# Patient Record
Sex: FEMALE | Race: WHITE | Employment: UNEMPLOYED | URBAN - METROPOLITAN AREA
[De-identification: names, ages, dates, MRNs, and addresses within clinical notes are randomized per-mention and may not be internally consistent; named-entity substitution may affect disease eponyms.]

---

## 2019-09-13 ENCOUNTER — HOSPITAL ENCOUNTER (OUTPATIENT)
Age: 19
Setting detail: OBSERVATION
Discharge: HOME | End: 2019-09-16
Attending: EMERGENCY MEDICINE | Admitting: OBSTETRICS & GYNECOLOGY
Payer: COMMERCIAL

## 2019-09-13 ENCOUNTER — HOSPITAL ENCOUNTER (EMERGENCY)
Age: 19
Discharge: ANOTHER HOSPITAL | End: 2019-09-13
Payer: COMMERCIAL

## 2019-09-13 ENCOUNTER — APPOINTMENT (OUTPATIENT)
Dept: CT IMAGING | Age: 19
End: 2019-09-13
Payer: COMMERCIAL

## 2019-09-13 ENCOUNTER — APPOINTMENT (OUTPATIENT)
Dept: ULTRASOUND IMAGING | Age: 19
End: 2019-09-13
Payer: COMMERCIAL

## 2019-09-13 VITALS
HEART RATE: 73 BPM | SYSTOLIC BLOOD PRESSURE: 114 MMHG | OXYGEN SATURATION: 97 % | RESPIRATION RATE: 16 BRPM | WEIGHT: 150 LBS | TEMPERATURE: 97.7 F | HEIGHT: 67 IN | DIASTOLIC BLOOD PRESSURE: 77 MMHG | BODY MASS INDEX: 23.54 KG/M2

## 2019-09-13 DIAGNOSIS — N83.8 OVARIAN MASS: Primary | ICD-10-CM

## 2019-09-13 DIAGNOSIS — N83.209 CYST OF OVARY, UNSPECIFIED LATERALITY: ICD-10-CM

## 2019-09-13 DIAGNOSIS — R19.00 ABDOMINAL MASS, UNSPECIFIED ABDOMINAL LOCATION: ICD-10-CM

## 2019-09-13 DIAGNOSIS — Z98.890 S/P LAPAROTOMY: ICD-10-CM

## 2019-09-13 DIAGNOSIS — N83.8 OVARIAN MASS, RIGHT: Primary | ICD-10-CM

## 2019-09-13 DIAGNOSIS — R93.5 ABNORMAL CT OF THE ABDOMEN: ICD-10-CM

## 2019-09-13 PROBLEM — K58.9 IBS (IRRITABLE BOWEL SYNDROME): Status: ACTIVE | Noted: 2019-09-13

## 2019-09-13 PROBLEM — F41.9 ANXIETY: Status: ACTIVE | Noted: 2019-09-13

## 2019-09-13 PROBLEM — J45.909 ASTHMA: Status: ACTIVE | Noted: 2019-09-13

## 2019-09-13 LAB
-: NORMAL
ABO/RH: NORMAL
ABSOLUTE EOS #: 0.27 K/UL (ref 0–0.44)
ABSOLUTE IMMATURE GRANULOCYTE: <0.03 K/UL (ref 0–0.3)
ABSOLUTE LYMPH #: 1.49 K/UL (ref 1.2–5.2)
ABSOLUTE MONO #: 0.57 K/UL (ref 0.1–1.4)
ALBUMIN SERPL-MCNC: 4.1 G/DL (ref 3.5–5.2)
ALBUMIN SERPL-MCNC: 4.4 G/DL (ref 3.5–5.2)
ALBUMIN/GLOBULIN RATIO: 1.6 (ref 1–2.5)
ALBUMIN/GLOBULIN RATIO: 1.8 (ref 1–2.5)
ALP BLD-CCNC: 49 U/L (ref 35–104)
ALP BLD-CCNC: 49 U/L (ref 35–104)
ALT SERPL-CCNC: 10 U/L (ref 5–33)
ALT SERPL-CCNC: 9 U/L (ref 5–33)
AMORPHOUS: NORMAL
ANION GAP SERPL CALCULATED.3IONS-SCNC: 11 MMOL/L (ref 9–17)
ANION GAP SERPL CALCULATED.3IONS-SCNC: 14 MMOL/L (ref 9–17)
ANTIBODY SCREEN: NEGATIVE
ARM BAND NUMBER: NORMAL
AST SERPL-CCNC: 13 U/L
AST SERPL-CCNC: 13 U/L
BACTERIA: NORMAL
BASOPHILS # BLD: 1 % (ref 0–2)
BASOPHILS ABSOLUTE: 0.08 K/UL (ref 0–0.2)
BILIRUB SERPL-MCNC: 0.9 MG/DL (ref 0.3–1.2)
BILIRUB SERPL-MCNC: 1.01 MG/DL (ref 0.3–1.2)
BILIRUBIN URINE: NEGATIVE
BUN BLDV-MCNC: 8 MG/DL (ref 6–20)
BUN BLDV-MCNC: 9 MG/DL (ref 6–20)
BUN/CREAT BLD: 14 (ref 9–20)
BUN/CREAT BLD: ABNORMAL (ref 9–20)
CA 125: 20 U/ML
CALCIUM SERPL-MCNC: 8.7 MG/DL (ref 8.6–10.4)
CALCIUM SERPL-MCNC: 9.1 MG/DL (ref 8.6–10.4)
CASTS UA: NORMAL /LPF
CHLORIDE BLD-SCNC: 104 MMOL/L (ref 98–107)
CHLORIDE BLD-SCNC: 106 MMOL/L (ref 98–107)
CO2: 20 MMOL/L (ref 20–31)
CO2: 24 MMOL/L (ref 20–31)
COLOR: YELLOW
COMMENT UA: NORMAL
CREAT SERPL-MCNC: 0.46 MG/DL (ref 0.5–0.9)
CREAT SERPL-MCNC: 0.65 MG/DL (ref 0.5–0.9)
CRYSTALS, UA: NORMAL /HPF
DIFFERENTIAL TYPE: ABNORMAL
EOSINOPHILS RELATIVE PERCENT: 4 % (ref 1–4)
EPITHELIAL CELLS UA: NORMAL /HPF (ref 0–25)
EXPIRATION DATE: NORMAL
GFR AFRICAN AMERICAN: >60 ML/MIN
GFR AFRICAN AMERICAN: >60 ML/MIN
GFR NON-AFRICAN AMERICAN: >60 ML/MIN
GFR NON-AFRICAN AMERICAN: >60 ML/MIN
GFR SERPL CREATININE-BSD FRML MDRD: ABNORMAL ML/MIN/{1.73_M2}
GFR SERPL CREATININE-BSD FRML MDRD: ABNORMAL ML/MIN/{1.73_M2}
GFR SERPL CREATININE-BSD FRML MDRD: NORMAL ML/MIN/{1.73_M2}
GFR SERPL CREATININE-BSD FRML MDRD: NORMAL ML/MIN/{1.73_M2}
GLUCOSE BLD-MCNC: 79 MG/DL (ref 70–99)
GLUCOSE BLD-MCNC: 86 MG/DL (ref 70–99)
GLUCOSE URINE: NEGATIVE
HCG QUANTITATIVE: <1 IU/L
HCG(URINE) PREGNANCY TEST: NEGATIVE
HCT VFR BLD CALC: 36.5 % (ref 36.3–47.1)
HCT VFR BLD CALC: 36.8 % (ref 36.3–47.1)
HEMOGLOBIN: 11.1 G/DL (ref 11.9–15.1)
HEMOGLOBIN: 11.5 G/DL (ref 11.9–15.1)
IMMATURE GRANULOCYTES: 0 %
KETONES, URINE: NEGATIVE
LACTIC ACID, WHOLE BLOOD: NORMAL MMOL/L (ref 0.7–2.1)
LACTIC ACID: 0.6 MMOL/L (ref 0.5–2.2)
LEUKOCYTE ESTERASE, URINE: NEGATIVE
LIPASE: 18 U/L (ref 13–60)
LYMPHOCYTES # BLD: 20 % (ref 25–45)
MCH RBC QN AUTO: 27.5 PG (ref 25.2–33.5)
MCH RBC QN AUTO: 28.4 PG (ref 25.2–33.5)
MCHC RBC AUTO-ENTMCNC: 30.4 G/DL (ref 28.4–34.8)
MCHC RBC AUTO-ENTMCNC: 31.3 G/DL (ref 28.4–34.8)
MCV RBC AUTO: 90.3 FL (ref 82.6–102.9)
MCV RBC AUTO: 90.9 FL (ref 82.6–102.9)
MONOCYTES # BLD: 8 % (ref 2–8)
MUCUS: NORMAL
NITRITE, URINE: NEGATIVE
NRBC AUTOMATED: 0 PER 100 WBC
NRBC AUTOMATED: 0 PER 100 WBC
OTHER OBSERVATIONS UA: NORMAL
PDW BLD-RTO: 12.7 % (ref 11.8–14.4)
PDW BLD-RTO: 12.8 % (ref 11.8–14.4)
PH UA: 7.5 (ref 5–9)
PLATELET # BLD: 202 K/UL (ref 138–453)
PLATELET # BLD: 226 K/UL (ref 138–453)
PLATELET ESTIMATE: ABNORMAL
PMV BLD AUTO: 10.3 FL (ref 8.1–13.5)
PMV BLD AUTO: 9.8 FL (ref 8.1–13.5)
POTASSIUM SERPL-SCNC: 3.7 MMOL/L (ref 3.7–5.3)
POTASSIUM SERPL-SCNC: 4.2 MMOL/L (ref 3.7–5.3)
PROTEIN UA: NEGATIVE
RBC # BLD: 4.04 M/UL (ref 3.95–5.11)
RBC # BLD: 4.05 M/UL (ref 3.95–5.11)
RBC # BLD: ABNORMAL 10*6/UL
RBC UA: NORMAL /HPF (ref 0–2)
RENAL EPITHELIAL, UA: NORMAL /HPF
SEG NEUTROPHILS: 67 % (ref 34–64)
SEGMENTED NEUTROPHILS ABSOLUTE COUNT: 5.17 K/UL (ref 1.8–8)
SODIUM BLD-SCNC: 139 MMOL/L (ref 135–144)
SODIUM BLD-SCNC: 140 MMOL/L (ref 135–144)
SPECIFIC GRAVITY UA: 1.01 (ref 1.01–1.02)
TOTAL PROTEIN: 6.7 G/DL (ref 6.4–8.3)
TOTAL PROTEIN: 6.9 G/DL (ref 6.4–8.3)
TRICHOMONAS: NORMAL
TURBIDITY: CLEAR
URINE HGB: NEGATIVE
UROBILINOGEN, URINE: NORMAL
WBC # BLD: 4.1 K/UL (ref 4.5–13.5)
WBC # BLD: 7.6 K/UL (ref 4.5–13.5)
WBC # BLD: ABNORMAL 10*3/UL
WBC UA: NORMAL /HPF (ref 0–5)
YEAST: NORMAL

## 2019-09-13 PROCEDURE — 81001 URINALYSIS AUTO W/SCOPE: CPT

## 2019-09-13 PROCEDURE — 93976 VASCULAR STUDY: CPT

## 2019-09-13 PROCEDURE — 83605 ASSAY OF LACTIC ACID: CPT

## 2019-09-13 PROCEDURE — 84702 CHORIONIC GONADOTROPIN TEST: CPT

## 2019-09-13 PROCEDURE — 85025 COMPLETE CBC W/AUTO DIFF WBC: CPT

## 2019-09-13 PROCEDURE — 6360000004 HC RX CONTRAST MEDICATION: Performed by: PHYSICIAN ASSISTANT

## 2019-09-13 PROCEDURE — G0378 HOSPITAL OBSERVATION PER HR: HCPCS

## 2019-09-13 PROCEDURE — 86850 RBC ANTIBODY SCREEN: CPT

## 2019-09-13 PROCEDURE — 86901 BLOOD TYPING SEROLOGIC RH(D): CPT

## 2019-09-13 PROCEDURE — 85027 COMPLETE CBC AUTOMATED: CPT

## 2019-09-13 PROCEDURE — 99285 EMERGENCY DEPT VISIT HI MDM: CPT

## 2019-09-13 PROCEDURE — 76830 TRANSVAGINAL US NON-OB: CPT

## 2019-09-13 PROCEDURE — 80053 COMPREHEN METABOLIC PANEL: CPT

## 2019-09-13 PROCEDURE — 76856 US EXAM PELVIC COMPLETE: CPT

## 2019-09-13 PROCEDURE — 6370000000 HC RX 637 (ALT 250 FOR IP): Performed by: PHYSICIAN ASSISTANT

## 2019-09-13 PROCEDURE — 86900 BLOOD TYPING SEROLOGIC ABO: CPT

## 2019-09-13 PROCEDURE — 86304 IMMUNOASSAY TUMOR CA 125: CPT

## 2019-09-13 PROCEDURE — 83690 ASSAY OF LIPASE: CPT

## 2019-09-13 PROCEDURE — 36415 COLL VENOUS BLD VENIPUNCTURE: CPT

## 2019-09-13 PROCEDURE — 74177 CT ABD & PELVIS W/CONTRAST: CPT

## 2019-09-13 PROCEDURE — 81025 URINE PREGNANCY TEST: CPT

## 2019-09-13 RX ORDER — DOCUSATE SODIUM 100 MG/1
100 CAPSULE, LIQUID FILLED ORAL 2 TIMES DAILY
Status: ON HOLD | COMMUNITY
End: 2019-09-16 | Stop reason: HOSPADM

## 2019-09-13 RX ORDER — CARBAMAZEPINE 200 MG/1
1000 TABLET ORAL EVERY EVENING
COMMUNITY
End: 2021-03-10 | Stop reason: ALTCHOICE

## 2019-09-13 RX ORDER — ACETAMINOPHEN 325 MG/1
650 TABLET ORAL ONCE
Status: COMPLETED | OUTPATIENT
Start: 2019-09-13 | End: 2019-09-13

## 2019-09-13 RX ORDER — LEVOTHYROXINE SODIUM 0.05 MG/1
50 TABLET ORAL DAILY
COMMUNITY
End: 2019-10-03 | Stop reason: ALTCHOICE

## 2019-09-13 RX ORDER — CARBAMAZEPINE 200 MG/1
800 TABLET ORAL EVERY MORNING
COMMUNITY
End: 2021-03-10 | Stop reason: ALTCHOICE

## 2019-09-13 RX ORDER — M-VIT,TX,IRON,MINS/CALC/FOLIC 27MG-0.4MG
1 TABLET ORAL DAILY
COMMUNITY
End: 2021-03-10 | Stop reason: ALTCHOICE

## 2019-09-13 RX ORDER — RIVASTIGMINE TARTRATE 6 MG/1
6 CAPSULE ORAL 2 TIMES DAILY
COMMUNITY
End: 2019-10-03

## 2019-09-13 RX ORDER — OYSTER SHELL CALCIUM WITH VITAMIN D 500; 200 MG/1; [IU]/1
1 TABLET, FILM COATED ORAL 2 TIMES DAILY
COMMUNITY
End: 2021-03-10 | Stop reason: ALTCHOICE

## 2019-09-13 RX ORDER — ACETAMINOPHEN 325 MG/1
650 TABLET ORAL EVERY 4 HOURS PRN
COMMUNITY

## 2019-09-13 RX ORDER — FENOFIBRATE 145 MG/1
145 TABLET, COATED ORAL DAILY
COMMUNITY
End: 2021-03-10 | Stop reason: ALTCHOICE

## 2019-09-13 RX ORDER — BUSPIRONE HYDROCHLORIDE 10 MG/1
20 TABLET ORAL DAILY
COMMUNITY
End: 2021-03-10 | Stop reason: ALTCHOICE

## 2019-09-13 RX ADMIN — ACETAMINOPHEN 650 MG: 325 TABLET, FILM COATED ORAL at 13:32

## 2019-09-13 RX ADMIN — IOPAMIDOL 75 ML: 755 INJECTION, SOLUTION INTRAVENOUS at 12:58

## 2019-09-13 ASSESSMENT — ENCOUNTER SYMPTOMS
SORE THROAT: 0
DIARRHEA: 0
VOMITING: 0
WHEEZING: 0
EYE DISCHARGE: 0
COUGH: 0
CHEST TIGHTNESS: 0
ABDOMINAL PAIN: 1
SHORTNESS OF BREATH: 0
COUGH: 0
EYE REDNESS: 0
RHINORRHEA: 0
CONSTIPATION: 0
VOMITING: 0
SHORTNESS OF BREATH: 0
NAUSEA: 1
DIARRHEA: 0
BACK PAIN: 0
NAUSEA: 1
SORE THROAT: 0
BLOOD IN STOOL: 0
ABDOMINAL PAIN: 1
CONSTIPATION: 0

## 2019-09-13 ASSESSMENT — PAIN DESCRIPTION - FREQUENCY
FREQUENCY: CONTINUOUS
FREQUENCY: CONTINUOUS

## 2019-09-13 ASSESSMENT — PAIN DESCRIPTION - LOCATION
LOCATION: ABDOMEN
LOCATION: ABDOMEN
LOCATION: HEAD;PELVIS

## 2019-09-13 ASSESSMENT — PAIN DESCRIPTION - ORIENTATION
ORIENTATION: RIGHT;LOWER
ORIENTATION: RIGHT;LOWER

## 2019-09-13 ASSESSMENT — PAIN DESCRIPTION - DESCRIPTORS
DESCRIPTORS: CRAMPING;SHARP
DESCRIPTORS: CRAMPING;SHARP

## 2019-09-13 ASSESSMENT — PAIN DESCRIPTION - PAIN TYPE
TYPE: ACUTE PAIN

## 2019-09-13 ASSESSMENT — PAIN SCALES - GENERAL
PAINLEVEL_OUTOF10: 7
PAINLEVEL_OUTOF10: 7
PAINLEVEL_OUTOF10: 8
PAINLEVEL_OUTOF10: 6

## 2019-09-13 ASSESSMENT — PAIN DESCRIPTION - ONSET: ONSET: ON-GOING

## 2019-09-13 NOTE — ED NOTES
Writer and THAIS Valera PA-C at the bedside to discuss results and which hospital they would prefer to be transferred to.     Fariba Becerra RN  09/13/19 9538

## 2019-09-13 NOTE — CONSULTS
[address] [facility]    Patient Name: Hayden Jacobo     Patient : 2000  Room/Bed: 15[facility]  Admission Date/Time: 2019  7:12 PM  Primary Care Physician: Joseph Henderson DO    Consulting Provider: Dr. Naomi Tariq MD  Reason for Consult: Ovarian Mass    CC:   Chief Complaint   Patient presents with    Abdominal Pain     RLQ since this morning    Nausea                HPI: Hayden Jacobo is a 19 y.o. female  who presents to the ED as a tranfers from [place] , c/o RLQ pain since this morning. She reports waking up with nausea and crampy pain in her RLQ. She states the pain has been constant all day on the right and radiates to her right low back. She does report bloating for the past 6 months. She reports abdominal cramping and constipation/diarrhea that she has seen her PCP for and was told her symptoms were attributable to her IBS. She has tried Tylenol without much relief. The is worse with valsalva and laying flat. She denies vaginal bleeding, discharge, dysuria, hematuria, V/D, constipation, loss of appetite, F/C, SOB, chest pain. She reports having regular monthly periods with mild cramping. Her last bowel movement was today. Patient's last menstrual period was 2019.      REVIEW OF SYSTEMS:   Constitutional: negative fever, negative chills  HEENT: negative visual disturbances, negative headaches  Respiratory: negative dyspnea, negative cough  Cardiovascular: negative chest pain,  negative palpitations  Gastrointestinal: positive RLQ abdominal pain, negative RUQ pain, positive nausea, negative vomiting, negative diarrhea, negative constipation, positive bloating  Genitourinary: negative dysuria, negative vaginal discharge, negative vaginal bleeding  Dermatological: negative rash, negative wounds  Hematologic: negative bleeding/clotting disorder  Immunologic: negative recent illness, negative recent sick contact, negative allergic reactions  Lymphatic: and pelvis today. HISTORY: ORDERING SYSTEM PROVIDED HISTORY: Abdominal mass, unspecified abdominal location FINDINGS: Measurements: Uterus:  10.6 x 2.8 x 2.4 cm Endometrial stripe:  6 mm Right Ovary:  Not measured Left Ovary:  4.9 x 2.8 x 2.4 cm Ultrasound Findings: Uterus: Uterus demonstrates normal myometrial echotexture. Endometrial stripe: Endometrial stripe is within normal limits. Right Ovary: The right ovary is not identified. Left Ovary:  Left ovary is within normal limits. There is normal arterial and venous doppler flow. Free Fluid: No evidence of free fluid. Large cystic mass occupies the majority of the inferior abdomen and pelvis measuring at least 20 x 8 x 17 cm, corresponding to that demonstrated with CT. There is a thin septation noted. No internal solid components or Doppler signal.     Large cystic mass with thin septation in the abdomen and pelvis, as demonstrated on CT. This presumably arises from the right adnexa or ovary, however ovarian parenchyma is not demonstrated associated with this mass. Gynecology consultation is recommended. Normal appearance of the uterus and left ovary. Ct Abdomen Pelvis W Iv Contrast Additional Contrast? None    Result Date: 9/13/2019  EXAMINATION: CT OF THE ABDOMEN AND PELVIS WITH CONTRAST, 9/13/2019 12:55 pm TECHNIQUE: CT of the abdomen and pelvis was performed with the administration of intravenous contrast. Multiplanar reformatted images are provided for review. Dose modulation, iterative reconstruction, and/or weight based adjustment of the mA/kV was utilized to reduce the radiation dose to as low as reasonably achievable. COMPARISON: None HISTORY: ORDERING SYSTEM PROVIDED HISTORY: [facility] abdominal pain TECHNOLOGIST PROVIDED HISTORY: FINDINGS: Lower Chest: No acute findings. Organs: No focal lesions of the liver, spleen, adrenal glands, pancreas, or gallbladder. Small splenule.   3 mm left renal calculus and probable subcentimeter cyst.  Mild fullness of the renal collecting systems and pelves bilaterally. GI/Bowel: No CT evidence of appendicitis. Mild fecal stasis in the visualized colon. No signs of bowel obstruction. Pelvis: There is a very large cystic mass measuring approximately 21 cm transverse by 9 cm AP by 15 cm cephalocaudal with compression of regional structures and an apparent thin septation. No enhancing nodules are seen. This is most likely arising from the right ovary representing a large ovarian cyst/serous cystadenoma. Differential includes a mucinous cystadenoma, mesenteric cyst, and probably less likely cystic ovarian malignancy. Gynecologic consultation is recommended. The uterus and left ovary appear grossly unremarkable by CT. Peritoneum/Retroperitoneum: No evidence of abdominal aortic aneurysm. Narrowing of the celiac trunk with apparent poststenotic dilatation related to the diaphragmatic huy/median arcuate ligament syndrome. No significant bulky adenopathy. Bones/Soft Tissues: No acute osseous abnormality is seen. Large cystic mass in the pelvis extending up to the abdomen measuring up to 21 cm most likely ovarian in origin. 3 mm left renal calculus. Mild fullness of the renal collecting systems likely related to compression of the ureters from the above described mass. Findings were discussed with Thelma Irby at 1:31 pm on 2019. ASSESSMENT & PLAN:    Leoncio Encarnacion is a 19 y.o. female  transferred to [facility] ED from [place]    [id number] ovarian mass    - VSS   - Abdomen non tender, no guarding or rigidity. No concern for torsion or other acute process requiring immediate surgery. -  CT abdomen/pelvis: Large cystic mass in the pelvis extending up to the abdomen measuring up to 21 cm most likely ovarian in origin. 3 mm left renal calculus.  Mild fullness of the renal collecting systems likely related to compression of the ureters from the above described mass.    -  U/S: Large cystic mass with thin

## 2019-09-13 NOTE — ED PROVIDER NOTES
Skin: Negative for pallor and rash. Allergic/Immunologic: Negative for food allergies and immunocompromised state. Neurological: Negative for dizziness, syncope, weakness and light-headedness. Hematological: Negative for adenopathy. Does not bruise/bleed easily. Psychiatric/Behavioral: Negative for behavioral problems and suicidal ideas. The patient is not nervous/anxious. Except as noted above the remainder of the review of systems was reviewed and negative. PAST MEDICAL HISTORY   History reviewed. No pertinent past medical history. SURGICALHISTORY       Past Surgical History:   Procedure Laterality Date    TONSILLECTOMY           CURRENT MEDICATIONS       Previous Medications    ACETAMINOPHEN (TYLENOL) 325 MG TABLET    Take 650 mg by mouth every 4 hours as needed for Pain    BUSPIRONE (BUSPAR) 10 MG TABLET    Take 20 mg by mouth 3 times daily    CALCIUM-VITAMIN D (OSCAL-500) 500-200 MG-UNIT PER TABLET    Take 1 tablet by mouth 2 times daily    CARBAMAZEPINE (TEGRETOL) 200 MG TABLET    Take 800 mg by mouth every morning    CARBAMAZEPINE (TEGRETOL) 200 MG TABLET    Take 1,000 mg by mouth every evening    DOCUSATE SODIUM (COLACE) 100 MG CAPSULE    Take 100 mg by mouth 2 times daily    FENOFIBRATE (TRICOR) 145 MG TABLET    Take 145 mg by mouth daily    LEVOTHYROXINE (SYNTHROID) 50 MCG TABLET    Take 50 mcg by mouth Daily    MAGNESIUM HYDROXIDE (MILK OF MAGNESIA) 400 MG/5ML SUSPENSION    Take 30 mLs by mouth daily as needed for Constipation    MULTIPLE VITAMINS-MINERALS (THERAPEUTIC MULTIVITAMIN-MINERALS) TABLET    Take 1 tablet by mouth daily    RIVASTIGMINE (EXELON) 6 MG CAPSULE    Take 6 mg by mouth 2 times daily    VITAMIN D 1000 UNITS CAPS    Take 1 capsule by mouth daily       ALLERGIES     Patient has no known allergies. FAMILY HISTORY     History reviewed. No pertinent family history.        SOCIAL HISTORY       Social History     Socioeconomic History    Marital status: Single

## 2019-09-13 NOTE — ED NOTES
Contacted Dr. Lizzie Montenegro per [facility] request.     Eryn PAYAN Reser  09/13/19 0628

## 2019-09-13 NOTE — ED PROVIDER NOTES
Musculoskeletal: Normal range of motion. She exhibits no deformity. Neurological: She is alert and oriented to person, place, and time. Skin: Skin is warm and dry. Capillary refill takes less than 2 seconds. No rash noted. She is not diaphoretic. Psychiatric: Thought content normal.   Nursing note and vitals reviewed. DIFFERENTIAL  DIAGNOSIS     PLAN (LABS / IMAGING / EKG):  Orders Placed This Encounter   Procedures        CBC WITH AUTO DIFFERENTIAL    COMPREHENSIVE METABOLIC PANEL    HCG, Quantitative, Pregnancy    Inpatient consult to Obstetrics / Gynecology    TYPE AND SCREEN    PATIENT STATUS (FROM ED OR OR/PROCEDURAL) Observation       MEDICATIONS ORDERED:  No orders of the defined types were placed in this encounter.       DDX: Ovarian mass, pelvic mass, ovarian torsion, appendicitis, ectopic pregnancy    DIAGNOSTIC RESULTS / EMERGENCY DEPARTMENT COURSE / MDM     LABS:  Results for orders placed or performed during the hospital encounter of 09/13/19      Result Value Ref Range     20 <38 U/mL   CBC WITH AUTO DIFFERENTIAL   Result Value Ref Range    WBC 7.6 4.5 - 13.5 k/uL    RBC 4.04 3.95 - 5.11 m/uL    Hemoglobin 11.1 (L) 11.9 - 15.1 g/dL    Hematocrit 36.5 36.3 - 47.1 %    MCV 90.3 82.6 - 102.9 fL    MCH 27.5 25.2 - 33.5 pg    MCHC 30.4 28.4 - 34.8 g/dL    RDW 12.8 11.8 - 14.4 %    Platelets 941 422 - 995 k/uL    MPV 10.3 8.1 - 13.5 fL    NRBC Automated 0.0 0.0 per 100 WBC    Differential Type NOT REPORTED     Seg Neutrophils 67 (H) 34 - 64 %    Lymphocytes 20 (L) 25 - 45 %    Monocytes 8 2 - 8 %    Eosinophils % 4 1 - 4 %    Basophils 1 0 - 2 %    Immature Granulocytes 0 0 %    Segs Absolute 5.17 1.80 - 8.00 k/uL    Absolute Lymph # 1.49 1.20 - 5.20 k/uL    Absolute Mono # 0.57 0.10 - 1.40 k/uL    Absolute Eos # 0.27 0.00 - 0.44 k/uL    Basophils Absolute 0.08 0.00 - 0.20 k/uL    Absolute Immature Granulocyte <0.03 0.00 - 0.30 k/uL    WBC Morphology NOT REPORTED     RBC Morphology NOT REPORTED     Platelet Estimate NOT REPORTED    COMPREHENSIVE METABOLIC PANEL   Result Value Ref Range    Glucose 79 70 - 99 mg/dL    BUN 8 6 - 20 mg/dL    CREATININE 0.46 (L) 0.50 - 0.90 mg/dL    Bun/Cre Ratio NOT REPORTED 9 - 20    Calcium 8.7 8.6 - 10.4 mg/dL    Sodium 140 135 - 144 mmol/L    Potassium 3.7 3.7 - 5.3 mmol/L    Chloride 106 98 - 107 mmol/L    CO2 20 20 - 31 mmol/L    Anion Gap 14 9 - 17 mmol/L    Alkaline Phosphatase 49 35 - 104 U/L    ALT 9 5 - 33 U/L    AST 13 <32 U/L    Total Bilirubin 1.01 0.3 - 1.2 mg/dL    Total Protein 6.7 6.4 - 8.3 g/dL    Alb 4.1 3.5 - 5.2 g/dL    Albumin/Globulin Ratio 1.6 1.0 - 2.5    GFR Non-African American >60 >60 mL/min    GFR African American >60 >60 mL/min    GFR Comment          GFR Staging NOT REPORTED    TYPE AND SCREEN   Result Value Ref Range    Expiration Date 09/16/2019     Arm Band Number BE [id number]     ABO/Rh A POSITIVE     Antibody Screen NEGATIVE        RADIOLOGY:  None    EKG  None    All EKG's are interpreted by the Emergency Department Physician who either signs or Co-signs this chart in the absence of a cardiologist.    MDM/EMERGENCY DEPARTMENT COURSE:  Patient is a 18-year-old female transferred from outside hospital for pelvic ovarian mass on the right side. On exam no acute distress, vital signs are stable afebrile nontoxic. Ultrasound shows large cystic mass in abdomen and pelvis presumably arising from the adnexa up to 21 cm. Abdomen if soft, tender in RLQ, no peritoneal findings. 7:53 PM  [facility] states she will come see patient. 10:50 PM  Spoke with OB resident who will admit patient primarily to observation unit. They plan to do surgery tomorrow morning. Patient was consented by them for ex lap. PROCEDURES:  None    CONSULTS:  IP CONSULT TO OB GYN    CRITICAL CARE:  None    FINAL IMPRESSION      1.  Ovarian mass, right          DISPOSITION / [address] Admitted 09/13/2019

## 2019-09-14 ENCOUNTER — ANESTHESIA EVENT (OUTPATIENT)
Dept: OPERATING ROOM | Age: 19
End: 2019-09-14
Payer: COMMERCIAL

## 2019-09-14 PROCEDURE — 6370000000 HC RX 637 (ALT 250 FOR IP): Performed by: STUDENT IN AN ORGANIZED HEALTH CARE EDUCATION/TRAINING PROGRAM

## 2019-09-14 PROCEDURE — G0378 HOSPITAL OBSERVATION PER HR: HCPCS

## 2019-09-14 PROCEDURE — 96375 TX/PRO/DX INJ NEW DRUG ADDON: CPT

## 2019-09-14 PROCEDURE — 6360000002 HC RX W HCPCS: Performed by: STUDENT IN AN ORGANIZED HEALTH CARE EDUCATION/TRAINING PROGRAM

## 2019-09-14 PROCEDURE — 99024 POSTOP FOLLOW-UP VISIT: CPT | Performed by: OBSTETRICS & GYNECOLOGY

## 2019-09-14 PROCEDURE — 96374 THER/PROPH/DIAG INJ IV PUSH: CPT

## 2019-09-14 PROCEDURE — 2580000003 HC RX 258: Performed by: STUDENT IN AN ORGANIZED HEALTH CARE EDUCATION/TRAINING PROGRAM

## 2019-09-14 RX ORDER — ONDANSETRON 2 MG/ML
4 INJECTION INTRAMUSCULAR; INTRAVENOUS EVERY 6 HOURS PRN
Status: DISCONTINUED | OUTPATIENT
Start: 2019-09-14 | End: 2019-09-16 | Stop reason: HOSPADM

## 2019-09-14 RX ORDER — KETOROLAC TROMETHAMINE 30 MG/ML
30 INJECTION, SOLUTION INTRAMUSCULAR; INTRAVENOUS ONCE
Status: COMPLETED | OUTPATIENT
Start: 2019-09-14 | End: 2019-09-14

## 2019-09-14 RX ORDER — SODIUM PHOSPHATE, DIBASIC AND SODIUM PHOSPHATE, MONOBASIC 7; 19 G/133ML; G/133ML
1 ENEMA RECTAL
Status: COMPLETED | OUTPATIENT
Start: 2019-09-14 | End: 2019-09-14

## 2019-09-14 RX ORDER — SODIUM CHLORIDE 0.9 % (FLUSH) 0.9 %
10 SYRINGE (ML) INJECTION PRN
Status: DISCONTINUED | OUTPATIENT
Start: 2019-09-14 | End: 2019-09-16 | Stop reason: HOSPADM

## 2019-09-14 RX ORDER — SODIUM CHLORIDE, SODIUM LACTATE, POTASSIUM CHLORIDE, CALCIUM CHLORIDE 600; 310; 30; 20 MG/100ML; MG/100ML; MG/100ML; MG/100ML
INJECTION, SOLUTION INTRAVENOUS CONTINUOUS
Status: DISCONTINUED | OUTPATIENT
Start: 2019-09-14 | End: 2019-09-15

## 2019-09-14 RX ADMIN — KETOROLAC TROMETHAMINE 30 MG: 30 INJECTION, SOLUTION INTRAMUSCULAR; INTRAVENOUS at 00:13

## 2019-09-14 RX ADMIN — SODIUM PHOSPHATE, DIBASIC AND SODIUM PHOSPHATE, MONOBASIC 1 ENEMA: 7; 19 ENEMA RECTAL at 20:56

## 2019-09-14 RX ADMIN — SODIUM CHLORIDE, POTASSIUM CHLORIDE, SODIUM LACTATE AND CALCIUM CHLORIDE: 600; 310; 30; 20 INJECTION, SOLUTION INTRAVENOUS at 01:59

## 2019-09-14 RX ADMIN — ONDANSETRON 4 MG: 2 INJECTION INTRAMUSCULAR; INTRAVENOUS at 17:29

## 2019-09-14 ASSESSMENT — PAIN DESCRIPTION - PAIN TYPE: TYPE: ACUTE PAIN

## 2019-09-14 ASSESSMENT — PAIN DESCRIPTION - PROGRESSION
CLINICAL_PROGRESSION: NOT CHANGED

## 2019-09-14 ASSESSMENT — PAIN SCALES - GENERAL
PAINLEVEL_OUTOF10: 0
PAINLEVEL_OUTOF10: 4
PAINLEVEL_OUTOF10: 0
PAINLEVEL_OUTOF10: 8
PAINLEVEL_OUTOF10: 0

## 2019-09-14 ASSESSMENT — PAIN DESCRIPTION - LOCATION: LOCATION: ABDOMEN

## 2019-09-14 ASSESSMENT — PAIN DESCRIPTION - FREQUENCY: FREQUENCY: INTERMITTENT

## 2019-09-14 ASSESSMENT — PAIN DESCRIPTION - DESCRIPTORS: DESCRIPTORS: DISCOMFORT;CRAMPING;TIGHTNESS

## 2019-09-14 ASSESSMENT — PAIN DESCRIPTION - ORIENTATION: ORIENTATION: ANTERIOR

## 2019-09-14 NOTE — H&P
to the abdomen measuring up to 21 cm most likely ovarian in origin. 3 mm left renal calculus.  Mild fullness of the renal collecting systems likely related to compression of the ureters from the above described mass. - 9/13 U/S: Large cystic mass with thin septation in the abdomen and pelvis, as demonstrated on CT.  This presumably arises from the right adnexa or ovary, however ovarian parenchyma is not demonstrated associated with this mass. - L Ovarian Mass measuring 20 x 8 x 17 cm               - CA-125, bHCG,  CBC, CMP, T&S ordered              - Tylenol/Motrin for pain conrtol              - Plan for drainage with cystectomy and possible oophorectomy with Dr Iwona Murray tomorrow morning 9/14 at earliest time available per OR               - Consent obtained, all R/B/A discussed. Patient's mother, father, and sister at bedside. All questions answered and concerns addressed.       Asthma              - Controlled on Albuterol     Anxiety              - Denies SI/HI              - Controlled on Sertraline     IBS              - Follows with Family Practionioner        Plan discussed with Dr. Silver Bruce, who is agreeable. Additionally discussed case with Dr Iwona Murray who will operate on patient tomorrow.         Attending's Name: Dr. Artur Ewing DO  Ob/Gyn Resident  Pager: [phone]  9/13/2019, 7:57 PM            Dedrick Claude  OB/GYN Resident, PGY3  Pager: [phone]  [facility]  09/13/19  10:23 PM     GYN oncology attending note: Patient seen and evaluated. Chart reviewed. Labs and imaging reviewed. Agree with resident's assessment of patient status as above. Patient has a large cystic mass in the abdomen. 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 Other possibilities exist.    The patient was advised to have surgery to remove the cystic mass. The surgery was explained to the patient and her parents in great detail.

## 2019-09-15 ENCOUNTER — ANESTHESIA (OUTPATIENT)
Dept: OPERATING ROOM | Age: 19
End: 2019-09-15
Payer: COMMERCIAL

## 2019-09-15 VITALS — TEMPERATURE: 97.9 F | SYSTOLIC BLOOD PRESSURE: 92 MMHG | DIASTOLIC BLOOD PRESSURE: 48 MMHG | OXYGEN SATURATION: 99 %

## 2019-09-15 PROBLEM — Z98.890 S/P LAPAROTOMY: Status: ACTIVE | Noted: 2019-09-15

## 2019-09-15 LAB — HCG(URINE) PREGNANCY TEST: NEGATIVE

## 2019-09-15 PROCEDURE — 2500000003 HC RX 250 WO HCPCS: Performed by: STUDENT IN AN ORGANIZED HEALTH CARE EDUCATION/TRAINING PROGRAM

## 2019-09-15 PROCEDURE — 2580000003 HC RX 258: Performed by: OBSTETRICS & GYNECOLOGY

## 2019-09-15 PROCEDURE — 2709999900 HC NON-CHARGEABLE SUPPLY: Performed by: OBSTETRICS & GYNECOLOGY

## 2019-09-15 PROCEDURE — 6360000002 HC RX W HCPCS: Performed by: NURSE ANESTHETIST, CERTIFIED REGISTERED

## 2019-09-15 PROCEDURE — 6360000002 HC RX W HCPCS: Performed by: ANESTHESIOLOGY

## 2019-09-15 PROCEDURE — 88305 TISSUE EXAM BY PATHOLOGIST: CPT

## 2019-09-15 PROCEDURE — 3600000014 HC SURGERY LEVEL 4 ADDTL 15MIN: Performed by: OBSTETRICS & GYNECOLOGY

## 2019-09-15 PROCEDURE — 3600000004 HC SURGERY LEVEL 4 BASE: Performed by: OBSTETRICS & GYNECOLOGY

## 2019-09-15 PROCEDURE — 88307 TISSUE EXAM BY PATHOLOGIST: CPT

## 2019-09-15 PROCEDURE — 3700000001 HC ADD 15 MINUTES (ANESTHESIA): Performed by: OBSTETRICS & GYNECOLOGY

## 2019-09-15 PROCEDURE — 6370000000 HC RX 637 (ALT 250 FOR IP): Performed by: STUDENT IN AN ORGANIZED HEALTH CARE EDUCATION/TRAINING PROGRAM

## 2019-09-15 PROCEDURE — 58940 REMOVAL OF OVARY(S): CPT | Performed by: OBSTETRICS & GYNECOLOGY

## 2019-09-15 PROCEDURE — G0378 HOSPITAL OBSERVATION PER HR: HCPCS

## 2019-09-15 PROCEDURE — 88112 CYTOPATH CELL ENHANCE TECH: CPT

## 2019-09-15 PROCEDURE — 6360000002 HC RX W HCPCS: Performed by: STUDENT IN AN ORGANIZED HEALTH CARE EDUCATION/TRAINING PROGRAM

## 2019-09-15 PROCEDURE — 3700000000 HC ANESTHESIA ATTENDED CARE: Performed by: OBSTETRICS & GYNECOLOGY

## 2019-09-15 PROCEDURE — 51798 US URINE CAPACITY MEASURE: CPT

## 2019-09-15 PROCEDURE — 2720000010 HC SURG SUPPLY STERILE: Performed by: OBSTETRICS & GYNECOLOGY

## 2019-09-15 PROCEDURE — 2780000010 HC IMPLANT OTHER: Performed by: OBSTETRICS & GYNECOLOGY

## 2019-09-15 PROCEDURE — 2580000003 HC RX 258: Performed by: STUDENT IN AN ORGANIZED HEALTH CARE EDUCATION/TRAINING PROGRAM

## 2019-09-15 PROCEDURE — 7100000000 HC PACU RECOVERY - FIRST 15 MIN: Performed by: OBSTETRICS & GYNECOLOGY

## 2019-09-15 PROCEDURE — 81025 URINE PREGNANCY TEST: CPT

## 2019-09-15 PROCEDURE — 7100000001 HC PACU RECOVERY - ADDTL 15 MIN: Performed by: OBSTETRICS & GYNECOLOGY

## 2019-09-15 PROCEDURE — 2500000003 HC RX 250 WO HCPCS: Performed by: NURSE ANESTHETIST, CERTIFIED REGISTERED

## 2019-09-15 RX ORDER — LIDOCAINE HYDROCHLORIDE 10 MG/ML
INJECTION, SOLUTION EPIDURAL; INFILTRATION; INTRACAUDAL; PERINEURAL PRN
Status: DISCONTINUED | OUTPATIENT
Start: 2019-09-15 | End: 2019-09-15 | Stop reason: SDUPTHER

## 2019-09-15 RX ORDER — MAGNESIUM HYDROXIDE 1200 MG/15ML
LIQUID ORAL CONTINUOUS PRN
Status: COMPLETED | OUTPATIENT
Start: 2019-09-15 | End: 2019-09-15

## 2019-09-15 RX ORDER — IBUPROFEN 600 MG/1
600 TABLET ORAL EVERY 6 HOURS PRN
Status: DISCONTINUED | OUTPATIENT
Start: 2019-09-15 | End: 2019-09-16 | Stop reason: HOSPADM

## 2019-09-15 RX ORDER — LEVOTHYROXINE SODIUM 0.05 MG/1
50 TABLET ORAL DAILY
Status: DISCONTINUED | OUTPATIENT
Start: 2019-09-16 | End: 2019-09-16 | Stop reason: HOSPADM

## 2019-09-15 RX ORDER — ONDANSETRON 2 MG/ML
INJECTION INTRAMUSCULAR; INTRAVENOUS PRN
Status: DISCONTINUED | OUTPATIENT
Start: 2019-09-15 | End: 2019-09-15 | Stop reason: SDUPTHER

## 2019-09-15 RX ORDER — DOCUSATE SODIUM 100 MG/1
100 CAPSULE, LIQUID FILLED ORAL 2 TIMES DAILY
Status: DISCONTINUED | OUTPATIENT
Start: 2019-09-15 | End: 2019-09-16 | Stop reason: HOSPADM

## 2019-09-15 RX ORDER — ONDANSETRON 2 MG/ML
4 INJECTION INTRAMUSCULAR; INTRAVENOUS EVERY 8 HOURS PRN
Status: DISCONTINUED | OUTPATIENT
Start: 2019-09-15 | End: 2019-09-16 | Stop reason: HOSPADM

## 2019-09-15 RX ORDER — HYDROCODONE BITARTRATE AND ACETAMINOPHEN 5; 325 MG/1; MG/1
1 TABLET ORAL EVERY 4 HOURS PRN
Status: DISCONTINUED | OUTPATIENT
Start: 2019-09-15 | End: 2019-09-16 | Stop reason: HOSPADM

## 2019-09-15 RX ORDER — MEPERIDINE HYDROCHLORIDE 50 MG/ML
12.5 INJECTION INTRAMUSCULAR; INTRAVENOUS; SUBCUTANEOUS EVERY 5 MIN PRN
Status: DISCONTINUED | OUTPATIENT
Start: 2019-09-15 | End: 2019-09-15 | Stop reason: HOSPADM

## 2019-09-15 RX ORDER — FENTANYL CITRATE 50 UG/ML
50 INJECTION, SOLUTION INTRAMUSCULAR; INTRAVENOUS EVERY 5 MIN PRN
Status: COMPLETED | OUTPATIENT
Start: 2019-09-15 | End: 2019-09-15

## 2019-09-15 RX ORDER — SODIUM CHLORIDE 0.9 % (FLUSH) 0.9 %
10 SYRINGE (ML) INJECTION PRN
Status: DISCONTINUED | OUTPATIENT
Start: 2019-09-15 | End: 2019-09-16 | Stop reason: HOSPADM

## 2019-09-15 RX ORDER — DEXAMETHASONE SODIUM PHOSPHATE 10 MG/ML
INJECTION INTRAMUSCULAR; INTRAVENOUS PRN
Status: DISCONTINUED | OUTPATIENT
Start: 2019-09-15 | End: 2019-09-15 | Stop reason: SDUPTHER

## 2019-09-15 RX ORDER — EPHEDRINE SULFATE 50 MG/ML
INJECTION, SOLUTION INTRAVENOUS PRN
Status: DISCONTINUED | OUTPATIENT
Start: 2019-09-15 | End: 2019-09-15 | Stop reason: SDUPTHER

## 2019-09-15 RX ORDER — KETOROLAC TROMETHAMINE 30 MG/ML
30 INJECTION, SOLUTION INTRAMUSCULAR; INTRAVENOUS EVERY 6 HOURS
Status: COMPLETED | OUTPATIENT
Start: 2019-09-15 | End: 2019-09-15

## 2019-09-15 RX ORDER — PROMETHAZINE HYDROCHLORIDE 25 MG/ML
25 INJECTION, SOLUTION INTRAMUSCULAR; INTRAVENOUS EVERY 6 HOURS PRN
Status: DISCONTINUED | OUTPATIENT
Start: 2019-09-15 | End: 2019-09-16 | Stop reason: HOSPADM

## 2019-09-15 RX ORDER — HYDROCODONE BITARTRATE AND ACETAMINOPHEN 5; 325 MG/1; MG/1
2 TABLET ORAL EVERY 4 HOURS PRN
Status: DISCONTINUED | OUTPATIENT
Start: 2019-09-15 | End: 2019-09-16 | Stop reason: HOSPADM

## 2019-09-15 RX ORDER — PROMETHAZINE HYDROCHLORIDE 25 MG/ML
6.25 INJECTION, SOLUTION INTRAMUSCULAR; INTRAVENOUS
Status: COMPLETED | OUTPATIENT
Start: 2019-09-15 | End: 2019-09-15

## 2019-09-15 RX ORDER — ACETAMINOPHEN 325 MG/1
650 TABLET ORAL EVERY 4 HOURS PRN
Status: DISCONTINUED | OUTPATIENT
Start: 2019-09-15 | End: 2019-09-16 | Stop reason: HOSPADM

## 2019-09-15 RX ORDER — CALCIUM CARBONATE 200(500)MG
1000 TABLET,CHEWABLE ORAL 3 TIMES DAILY PRN
Status: DISCONTINUED | OUTPATIENT
Start: 2019-09-15 | End: 2019-09-16 | Stop reason: HOSPADM

## 2019-09-15 RX ORDER — SODIUM CHLORIDE 0.9 % (FLUSH) 0.9 %
10 SYRINGE (ML) INJECTION EVERY 12 HOURS SCHEDULED
Status: DISCONTINUED | OUTPATIENT
Start: 2019-09-15 | End: 2019-09-16 | Stop reason: HOSPADM

## 2019-09-15 RX ORDER — SIMETHICONE 80 MG
80 TABLET,CHEWABLE ORAL EVERY 6 HOURS PRN
Status: DISCONTINUED | OUTPATIENT
Start: 2019-09-15 | End: 2019-09-16 | Stop reason: HOSPADM

## 2019-09-15 RX ORDER — DEXTROSE, SODIUM CHLORIDE, AND POTASSIUM CHLORIDE 5; .45; .15 G/100ML; G/100ML; G/100ML
INJECTION INTRAVENOUS CONTINUOUS
Status: DISCONTINUED | OUTPATIENT
Start: 2019-09-15 | End: 2019-09-16 | Stop reason: HOSPADM

## 2019-09-15 RX ORDER — ONDANSETRON 4 MG/1
4 TABLET, FILM COATED ORAL EVERY 8 HOURS PRN
Status: DISCONTINUED | OUTPATIENT
Start: 2019-09-15 | End: 2019-09-15 | Stop reason: SDUPTHER

## 2019-09-15 RX ORDER — ROCURONIUM BROMIDE 10 MG/ML
INJECTION, SOLUTION INTRAVENOUS PRN
Status: DISCONTINUED | OUTPATIENT
Start: 2019-09-15 | End: 2019-09-15 | Stop reason: SDUPTHER

## 2019-09-15 RX ORDER — PROPOFOL 10 MG/ML
INJECTION, EMULSION INTRAVENOUS PRN
Status: DISCONTINUED | OUTPATIENT
Start: 2019-09-15 | End: 2019-09-15 | Stop reason: SDUPTHER

## 2019-09-15 RX ORDER — CEFAZOLIN SODIUM 2 G/50ML
SOLUTION INTRAVENOUS PRN
Status: DISCONTINUED | OUTPATIENT
Start: 2019-09-15 | End: 2019-09-15 | Stop reason: SDUPTHER

## 2019-09-15 RX ORDER — FENTANYL CITRATE 50 UG/ML
INJECTION, SOLUTION INTRAMUSCULAR; INTRAVENOUS PRN
Status: DISCONTINUED | OUTPATIENT
Start: 2019-09-15 | End: 2019-09-15 | Stop reason: SDUPTHER

## 2019-09-15 RX ORDER — 0.9 % SODIUM CHLORIDE 0.9 %
500 INTRAVENOUS SOLUTION INTRAVENOUS EVERY 4 HOURS PRN
Status: DISCONTINUED | OUTPATIENT
Start: 2019-09-15 | End: 2019-09-16 | Stop reason: HOSPADM

## 2019-09-15 RX ADMIN — BENZOCAINE, MENTHOL 1 LOZENGE: 15; 3.6 LOZENGE ORAL at 20:06

## 2019-09-15 RX ADMIN — FENTANYL CITRATE 100 MCG: 50 INJECTION INTRAMUSCULAR; INTRAVENOUS at 08:25

## 2019-09-15 RX ADMIN — KETOROLAC TROMETHAMINE 30 MG: 30 INJECTION, SOLUTION INTRAMUSCULAR at 11:35

## 2019-09-15 RX ADMIN — PROPOFOL 160 MG: 10 INJECTION, EMULSION INTRAVENOUS at 08:25

## 2019-09-15 RX ADMIN — PHENYLEPHRINE HYDROCHLORIDE 100 MCG: 10 INJECTION INTRAVENOUS at 08:50

## 2019-09-15 RX ADMIN — FENTANYL CITRATE 50 MCG: 50 INJECTION INTRAMUSCULAR; INTRAVENOUS at 11:10

## 2019-09-15 RX ADMIN — FENTANYL CITRATE 50 MCG: 50 INJECTION INTRAMUSCULAR; INTRAVENOUS at 11:57

## 2019-09-15 RX ADMIN — FENTANYL CITRATE 25 MCG: 50 INJECTION INTRAMUSCULAR; INTRAVENOUS at 12:06

## 2019-09-15 RX ADMIN — EPHEDRINE SULFATE 10 MG: 50 INJECTION, SOLUTION INTRAVENOUS at 10:14

## 2019-09-15 RX ADMIN — DEXAMETHASONE SODIUM PHOSPHATE 4 MG: 10 INJECTION INTRAMUSCULAR; INTRAVENOUS at 09:46

## 2019-09-15 RX ADMIN — FENTANYL CITRATE 50 MCG: 50 INJECTION INTRAMUSCULAR; INTRAVENOUS at 08:35

## 2019-09-15 RX ADMIN — HYDROCODONE BITARTRATE AND ACETAMINOPHEN 2 TABLET: 5; 325 TABLET ORAL at 20:13

## 2019-09-15 RX ADMIN — ONDANSETRON 4 MG: 2 INJECTION INTRAMUSCULAR; INTRAVENOUS at 14:13

## 2019-09-15 RX ADMIN — FENTANYL CITRATE 50 MCG: 50 INJECTION INTRAMUSCULAR; INTRAVENOUS at 11:23

## 2019-09-15 RX ADMIN — PHENYLEPHRINE HYDROCHLORIDE 100 MCG: 10 INJECTION INTRAVENOUS at 08:44

## 2019-09-15 RX ADMIN — LIDOCAINE HYDROCHLORIDE 50 MG: 10 INJECTION, SOLUTION EPIDURAL; INFILTRATION; INTRACAUDAL; PERINEURAL at 08:25

## 2019-09-15 RX ADMIN — HYDROMORPHONE HYDROCHLORIDE 0.25 MG: 1 INJECTION, SOLUTION INTRAMUSCULAR; INTRAVENOUS; SUBCUTANEOUS at 17:05

## 2019-09-15 RX ADMIN — HYDROMORPHONE HYDROCHLORIDE 0.5 MG: 1 INJECTION, SOLUTION INTRAMUSCULAR; INTRAVENOUS; SUBCUTANEOUS at 14:16

## 2019-09-15 RX ADMIN — KETOROLAC TROMETHAMINE 30 MG: 30 INJECTION, SOLUTION INTRAMUSCULAR at 18:04

## 2019-09-15 RX ADMIN — POTASSIUM CHLORIDE, DEXTROSE MONOHYDRATE AND SODIUM CHLORIDE: 150; 5; 450 INJECTION, SOLUTION INTRAVENOUS at 14:59

## 2019-09-15 RX ADMIN — Medication 10 ML: at 20:05

## 2019-09-15 RX ADMIN — CEFAZOLIN SODIUM 2 G: 2 SOLUTION INTRAVENOUS at 08:38

## 2019-09-15 RX ADMIN — ONDANSETRON 4 MG: 2 INJECTION, SOLUTION INTRAMUSCULAR; INTRAVENOUS at 10:09

## 2019-09-15 RX ADMIN — SODIUM CHLORIDE, POTASSIUM CHLORIDE, SODIUM LACTATE AND CALCIUM CHLORIDE: 600; 310; 30; 20 INJECTION, SOLUTION INTRAVENOUS at 08:18

## 2019-09-15 RX ADMIN — DOCUSATE SODIUM 100 MG: 100 CAPSULE, LIQUID FILLED ORAL at 15:07

## 2019-09-15 RX ADMIN — FENTANYL CITRATE 100 MCG: 50 INJECTION INTRAMUSCULAR; INTRAVENOUS at 10:12

## 2019-09-15 RX ADMIN — ROCURONIUM BROMIDE 40 MG: 10 INJECTION INTRAVENOUS at 08:25

## 2019-09-15 RX ADMIN — PROMETHAZINE HYDROCHLORIDE 6.25 MG: 25 INJECTION INTRAMUSCULAR; INTRAVENOUS at 12:12

## 2019-09-15 RX ADMIN — Medication 10 ML: at 18:05

## 2019-09-15 RX ADMIN — EPHEDRINE SULFATE 10 MG: 50 INJECTION, SOLUTION INTRAVENOUS at 09:07

## 2019-09-15 RX ADMIN — DOCUSATE SODIUM 100 MG: 100 CAPSULE, LIQUID FILLED ORAL at 20:05

## 2019-09-15 ASSESSMENT — PAIN SCALES - GENERAL
PAINLEVEL_OUTOF10: 8
PAINLEVEL_OUTOF10: 8
PAINLEVEL_OUTOF10: 5
PAINLEVEL_OUTOF10: 6
PAINLEVEL_OUTOF10: 8
PAINLEVEL_OUTOF10: 0
PAINLEVEL_OUTOF10: 8
PAINLEVEL_OUTOF10: 8
PAINLEVEL_OUTOF10: 0
PAINLEVEL_OUTOF10: 8
PAINLEVEL_OUTOF10: 9
PAINLEVEL_OUTOF10: 8
PAINLEVEL_OUTOF10: 5
PAINLEVEL_OUTOF10: 9
PAINLEVEL_OUTOF10: 5

## 2019-09-15 ASSESSMENT — PULMONARY FUNCTION TESTS
PIF_VALUE: 14
PIF_VALUE: 3
PIF_VALUE: 14
PIF_VALUE: 3
PIF_VALUE: 17
PIF_VALUE: 14
PIF_VALUE: 15
PIF_VALUE: 15
PIF_VALUE: 13
PIF_VALUE: 14
PIF_VALUE: 5
PIF_VALUE: 14
PIF_VALUE: 4
PIF_VALUE: 2
PIF_VALUE: 14
PIF_VALUE: 3
PIF_VALUE: 14
PIF_VALUE: 13
PIF_VALUE: 13
PIF_VALUE: 14
PIF_VALUE: 13
PIF_VALUE: 13
PIF_VALUE: 15
PIF_VALUE: 3
PIF_VALUE: 2
PIF_VALUE: 14
PIF_VALUE: 14
PIF_VALUE: 3
PIF_VALUE: 14
PIF_VALUE: 3
PIF_VALUE: 13
PIF_VALUE: 14
PIF_VALUE: 3
PIF_VALUE: 3
PIF_VALUE: 13
PIF_VALUE: 14
PIF_VALUE: 13
PIF_VALUE: 15
PIF_VALUE: 4
PIF_VALUE: 3
PIF_VALUE: 14
PIF_VALUE: 14
PIF_VALUE: 2
PIF_VALUE: 13
PIF_VALUE: 3
PIF_VALUE: 3
PIF_VALUE: 13
PIF_VALUE: 14
PIF_VALUE: 3
PIF_VALUE: 10
PIF_VALUE: 14
PIF_VALUE: 1
PIF_VALUE: 2
PIF_VALUE: 14
PIF_VALUE: 4
PIF_VALUE: 13
PIF_VALUE: 13
PIF_VALUE: 2
PIF_VALUE: 14
PIF_VALUE: 13
PIF_VALUE: 14
PIF_VALUE: 5
PIF_VALUE: 14
PIF_VALUE: 13
PIF_VALUE: 3
PIF_VALUE: 15
PIF_VALUE: 14

## 2019-09-15 ASSESSMENT — PAIN DESCRIPTION - PAIN TYPE
TYPE: SURGICAL PAIN
TYPE: ACUTE PAIN;SURGICAL PAIN
TYPE: SURGICAL PAIN

## 2019-09-15 ASSESSMENT — PAIN DESCRIPTION - DESCRIPTORS
DESCRIPTORS: DISCOMFORT
DESCRIPTORS: DISCOMFORT;SORE
DESCRIPTORS: DISCOMFORT
DESCRIPTORS: DISCOMFORT

## 2019-09-15 ASSESSMENT — PAIN DESCRIPTION - ORIENTATION
ORIENTATION: LOWER

## 2019-09-15 ASSESSMENT — PAIN DESCRIPTION - ONSET: ONSET: GRADUAL

## 2019-09-15 ASSESSMENT — PAIN DESCRIPTION - LOCATION
LOCATION: ABDOMEN

## 2019-09-15 ASSESSMENT — PAIN DESCRIPTION - FREQUENCY
FREQUENCY: INTERMITTENT
FREQUENCY: CONTINUOUS

## 2019-09-15 ASSESSMENT — PAIN - FUNCTIONAL ASSESSMENT: PAIN_FUNCTIONAL_ASSESSMENT: ACTIVITIES ARE NOT PREVENTED

## 2019-09-15 ASSESSMENT — PAIN DESCRIPTION - PROGRESSION: CLINICAL_PROGRESSION: GRADUALLY IMPROVING

## 2019-09-15 NOTE — ANESTHESIA PRE PROCEDURE
09/13/2019     09/13/2019    CO2 20 09/13/2019    BUN 8 09/13/2019    CREATININE 0.46 09/13/2019    GFRAA >60 09/13/2019    LABGLOM >60 09/13/2019    GLUCOSE 79 09/13/2019    PROT 6.7 09/13/2019    CALCIUM 8.7 09/13/2019    BILITOT 1.01 09/13/2019    ALKPHOS 49 09/13/2019    AST 13 09/13/2019    ALT 9 09/13/2019       POC Tests: No results for input(s): POCGLU, POCNA, POCK, POCCL, POCBUN, POCHEMO, POCHCT in the last 72 hours. Coags: No results found for: PROTIME, INR, APTT    HCG (If Applicable):   Lab Results   Component Value Date    PREGTESTUR NEGATIVE 09/13/2019    HCGQUANT <1 09/13/2019        ABGs: No results found for: PHART, PO2ART, EAB5USL, KFU7WUI, BEART, B8YFZTTF     Type & Screen (If Applicable):  [facility] found [facility]: [facility]    Anesthesia Evaluation  Patient summary reviewed no history of anesthetic complications:   Airway: Mallampati: II  TM distance: >3 FB   Neck ROM: full  Mouth opening: > = 3 FB Dental:          Pulmonary:normal exam    (+) asthma: seasonal asthma,                            Cardiovascular:Negative CV ROS            Rhythm: regular  Rate: normal                    Neuro/Psych:   Negative Neuro/Psych ROS              GI/Hepatic/Renal: Neg GI/Hepatic/Renal ROS            Endo/Other: Negative Endo/Other ROS                    Abdominal:       Abdomen: tender. Vascular: negative vascular ROS. Anesthesia Plan      general     ASA 2       Induction: intravenous. Anesthetic plan and risks discussed with patient. Use of blood products discussed with patient whom consented to blood products. Plan discussed with CRNA.                   Chapo Johnson MD   9/14/2019

## 2019-09-15 NOTE — DISCHARGE SUMMARY
tablet  Take 800 mg by mouth every morning             carBAMazepine (TEGRETOL) 200 MG tablet  Take 1,000 mg by mouth every evening             docusate sodium (COLACE, DULCOLAX) 100 MG CAPS  Take 100 mg by mouth 2 times daily             fenofibrate (TRICOR) 145 MG tablet  Take 145 mg by mouth daily             HYDROcodone-acetaminophen (NORCO) 5-325 MG per tablet  Take 1 tablet by mouth every 4 hours as needed for Pain for up to 5 days. ibuprofen (ADVIL;MOTRIN) 600 MG tablet  Take 1 tablet by mouth every 6 hours as needed for Pain or Fever             levothyroxine (SYNTHROID) 50 MCG tablet  Take 50 mcg by mouth Daily             magnesium hydroxide (MILK OF MAGNESIA) 400 MG/5ML suspension  Take 30 mLs by mouth daily as needed for Constipation             Multiple Vitamins-Minerals (THERAPEUTIC MULTIVITAMIN-MINERALS) tablet  Take 1 tablet by mouth daily             rivastigmine (EXELON) 6 MG capsule  Take 6 mg by mouth 2 times daily             simethicone (MYLICON) 80 MG chewable tablet  Take 1 tablet by mouth every 6 hours as needed for Flatulence             vitamin D 1000 units CAPS  Take 1 capsule by mouth daily                   Activity: no driving on narcotics, no lifting greater than 15 lbs  Diet: regular diet  Follow up: 2 weeks     Condition on discharge: good and stable   Discharge Date: 9/16/19    Comments:  Home care, Follow-up care, restrictions reviewed.     Rhona Andersen DO  Ob/Gyn Resident  [facility]  9/16/2019, 7:19 AM

## 2019-09-16 VITALS
DIASTOLIC BLOOD PRESSURE: 62 MMHG | BODY MASS INDEX: 22.22 KG/M2 | OXYGEN SATURATION: 99 % | HEART RATE: 76 BPM | WEIGHT: 150 LBS | SYSTOLIC BLOOD PRESSURE: 100 MMHG | RESPIRATION RATE: 15 BRPM | HEIGHT: 69 IN | TEMPERATURE: 98.2 F

## 2019-09-16 LAB
ABSOLUTE EOS #: 0.1 K/UL (ref 0–0.44)
ABSOLUTE IMMATURE GRANULOCYTE: <0.03 K/UL (ref 0–0.3)
ABSOLUTE LYMPH #: 1.65 K/UL (ref 1.2–5.2)
ABSOLUTE MONO #: 0.69 K/UL (ref 0.1–1.4)
ANION GAP SERPL CALCULATED.3IONS-SCNC: 10 MMOL/L (ref 9–17)
BASOPHILS # BLD: 1 % (ref 0–2)
BASOPHILS ABSOLUTE: 0.05 K/UL (ref 0–0.2)
BUN BLDV-MCNC: 6 MG/DL (ref 6–20)
BUN/CREAT BLD: ABNORMAL (ref 9–20)
CALCIUM SERPL-MCNC: 8.1 MG/DL (ref 8.6–10.4)
CASE NUMBER:: NORMAL
CHLORIDE BLD-SCNC: 108 MMOL/L (ref 98–107)
CO2: 21 MMOL/L (ref 20–31)
CREAT SERPL-MCNC: 0.46 MG/DL (ref 0.5–0.9)
DIFFERENTIAL TYPE: ABNORMAL
EOSINOPHILS RELATIVE PERCENT: 2 % (ref 1–4)
GFR AFRICAN AMERICAN: >60 ML/MIN
GFR NON-AFRICAN AMERICAN: >60 ML/MIN
GFR SERPL CREATININE-BSD FRML MDRD: ABNORMAL ML/MIN/{1.73_M2}
GFR SERPL CREATININE-BSD FRML MDRD: ABNORMAL ML/MIN/{1.73_M2}
GLUCOSE BLD-MCNC: 105 MG/DL (ref 70–99)
HCT VFR BLD CALC: 34.4 % (ref 36.3–47.1)
HEMOGLOBIN: 10 G/DL (ref 11.9–15.1)
IMMATURE GRANULOCYTES: 0 %
LYMPHOCYTES # BLD: 26 % (ref 25–45)
MCH RBC QN AUTO: 28.3 PG (ref 25.2–33.5)
MCHC RBC AUTO-ENTMCNC: 29.1 G/DL (ref 28.4–34.8)
MCV RBC AUTO: 97.5 FL (ref 82.6–102.9)
MONOCYTES # BLD: 11 % (ref 2–8)
NRBC AUTOMATED: 0 PER 100 WBC
PDW BLD-RTO: 13.1 % (ref 11.8–14.4)
PLATELET # BLD: 166 K/UL (ref 138–453)
PLATELET ESTIMATE: ABNORMAL
PMV BLD AUTO: 10.7 FL (ref 8.1–13.5)
POTASSIUM SERPL-SCNC: 3.6 MMOL/L (ref 3.7–5.3)
RBC # BLD: 3.53 M/UL (ref 3.95–5.11)
RBC # BLD: ABNORMAL 10*6/UL
SEG NEUTROPHILS: 60 % (ref 34–64)
SEGMENTED NEUTROPHILS ABSOLUTE COUNT: 3.82 K/UL (ref 1.8–8)
SODIUM BLD-SCNC: 139 MMOL/L (ref 135–144)
SPECIMEN DESCRIPTION: NORMAL
WBC # BLD: 6.3 K/UL (ref 4.5–13.5)
WBC # BLD: ABNORMAL 10*3/UL

## 2019-09-16 PROCEDURE — 36415 COLL VENOUS BLD VENIPUNCTURE: CPT

## 2019-09-16 PROCEDURE — 6370000000 HC RX 637 (ALT 250 FOR IP): Performed by: STUDENT IN AN ORGANIZED HEALTH CARE EDUCATION/TRAINING PROGRAM

## 2019-09-16 PROCEDURE — 80048 BASIC METABOLIC PNL TOTAL CA: CPT

## 2019-09-16 PROCEDURE — 2500000003 HC RX 250 WO HCPCS: Performed by: STUDENT IN AN ORGANIZED HEALTH CARE EDUCATION/TRAINING PROGRAM

## 2019-09-16 PROCEDURE — G0378 HOSPITAL OBSERVATION PER HR: HCPCS

## 2019-09-16 PROCEDURE — 85025 COMPLETE CBC W/AUTO DIFF WBC: CPT

## 2019-09-16 RX ORDER — SIMETHICONE 80 MG
80 TABLET,CHEWABLE ORAL EVERY 6 HOURS PRN
Qty: 30 TABLET | Refills: 0 | Status: SHIPPED | OUTPATIENT
Start: 2019-09-16 | End: 2021-03-10 | Stop reason: ALTCHOICE

## 2019-09-16 RX ORDER — HYDROCODONE BITARTRATE AND ACETAMINOPHEN 5; 325 MG/1; MG/1
1 TABLET ORAL EVERY 4 HOURS PRN
Qty: 20 TABLET | Refills: 0 | Status: SHIPPED | OUTPATIENT
Start: 2019-09-16 | End: 2019-09-21

## 2019-09-16 RX ORDER — IBUPROFEN 600 MG/1
600 TABLET ORAL EVERY 6 HOURS PRN
Qty: 30 TABLET | Refills: 0 | Status: SHIPPED | OUTPATIENT
Start: 2019-09-16 | End: 2021-03-10 | Stop reason: ALTCHOICE

## 2019-09-16 RX ORDER — PSEUDOEPHEDRINE HCL 30 MG
100 TABLET ORAL 2 TIMES DAILY
Qty: 30 CAPSULE | Refills: 0 | Status: SHIPPED | OUTPATIENT
Start: 2019-09-16 | End: 2021-03-10 | Stop reason: ALTCHOICE

## 2019-09-16 RX ADMIN — POTASSIUM CHLORIDE, DEXTROSE MONOHYDRATE AND SODIUM CHLORIDE: 150; 5; 450 INJECTION, SOLUTION INTRAVENOUS at 01:27

## 2019-09-16 RX ADMIN — LEVOTHYROXINE SODIUM 50 MCG: 50 TABLET ORAL at 06:10

## 2019-09-16 RX ADMIN — SIMETHICONE 80 MG: 80 TABLET, CHEWABLE ORAL at 09:46

## 2019-09-16 RX ADMIN — HYDROCODONE BITARTRATE AND ACETAMINOPHEN 2 TABLET: 5; 325 TABLET ORAL at 01:47

## 2019-09-16 RX ADMIN — HYDROCODONE BITARTRATE AND ACETAMINOPHEN 1 TABLET: 5; 325 TABLET ORAL at 09:46

## 2019-09-16 ASSESSMENT — PAIN DESCRIPTION - ORIENTATION: ORIENTATION: LOWER

## 2019-09-16 ASSESSMENT — PAIN DESCRIPTION - ONSET
ONSET: ON-GOING
ONSET: GRADUAL

## 2019-09-16 ASSESSMENT — PAIN SCALES - GENERAL
PAINLEVEL_OUTOF10: 0
PAINLEVEL_OUTOF10: 4
PAINLEVEL_OUTOF10: 0
PAINLEVEL_OUTOF10: 6
PAINLEVEL_OUTOF10: 0
PAINLEVEL_OUTOF10: 4
PAINLEVEL_OUTOF10: 0

## 2019-09-16 ASSESSMENT — PAIN DESCRIPTION - PAIN TYPE
TYPE: ACUTE PAIN
TYPE: ACUTE PAIN;SURGICAL PAIN

## 2019-09-16 ASSESSMENT — PAIN DESCRIPTION - LOCATION
LOCATION: ABDOMEN
LOCATION: ABDOMEN

## 2019-09-16 ASSESSMENT — PAIN DESCRIPTION - DESCRIPTORS
DESCRIPTORS: CRAMPING;SORE
DESCRIPTORS: SORE;CRAMPING;DISCOMFORT

## 2019-09-16 ASSESSMENT — PAIN DESCRIPTION - FREQUENCY
FREQUENCY: INTERMITTENT
FREQUENCY: INTERMITTENT

## 2019-09-16 ASSESSMENT — PAIN - FUNCTIONAL ASSESSMENT
PAIN_FUNCTIONAL_ASSESSMENT: PREVENTS OR INTERFERES SOME ACTIVE ACTIVITIES AND ADLS
PAIN_FUNCTIONAL_ASSESSMENT: ACTIVITIES ARE NOT PREVENTED

## 2019-09-16 ASSESSMENT — PAIN DESCRIPTION - PROGRESSION
CLINICAL_PROGRESSION: GRADUALLY IMPROVING
CLINICAL_PROGRESSION: GRADUALLY IMPROVING

## 2019-09-16 NOTE — OP NOTE
[address] [address]                                OPERATIVE REPORT    PATIENT NAME: Anushka Jamison                     :        2000  MED REC NO:   [id number]                             ROOM:  ACCOUNT NO:   [de-identified]                           ADMIT DATE: 2019  PROVIDER:     Taisha Nixon MD    DATE OF PROCEDURE:  09/15/2019    PREOPERATIVE DIAGNOSIS:  Large pelvic mass. POSTOPERATIVE DIAGNOSIS:  Large cystic right ovarian mass. PROCEDURES:  Exploratory laparotomy, drainage of right ovarian cystic  mass, and right oophorectomy. ANESTHESIA:  General.    SURGEON:  Taisha Nixon MD    ASSISTANT SURGEONS:  Dr. John Ballard, Dr. Adboul Godoy, and Dr. Ginny Tyson. ESTIMATED BLOOD LOSS:  30 mL. BLOOD PRODUCTS:  None. COMPLICATIONS:  None. OPERATION PERFORMED:  Text. SPECIAL MEDICATIONS:  Included Ancef 2 gm IVPB preoperatively and  heparin 5000 units subcu preoperatively. FIGUEROA:  There was a Figueroa catheter to straight drain during the  procedure. SCDs:  There were SCDs on the lower extremities during the procedure. LINES:  There was a peripheral IV of lactated Ringer's during the  procedure. BRIEF HISTORY AND INDICATION FOR OPERATION:  This patient is a  78-year-old,  0, para 0, female who presented to the emergency  room in [facility], complaining of severe right lower quadrant pain  lasting all day. She stated that she woke up with nausea and vomiting  and pain in the right lower quadrant. The pain radiated to her right  low back. She had reported loading for the last six months. She had  cramping, constipation, and diarrhea and had been seen by her PCP and  was told she probably had \"IBS. \"  The patient was transferred to [facility] in [place] and was admitted, and consultation was  obtained to the Gyn-Oncology Service.   The patient had a CT scan

## 2019-09-16 NOTE — PROGRESS NOTES
Progress Note    Date: 9/15/2019  Time: 5:49 PM    Hai Bowers 19 y.o. female No obstetric history on file., POD # 0    Patient seen and examined. She complained of nothing. Patient required straight catheterization by RN because she was unable to void when she returned to her room. Pain is controlled. Patient is  tolerating oral intake but has had only clear liquids thus far. She denies any vaginal bleeding. She is not yet ambulating without difficulty but admits she has not tried. She is not passing flatus. She denies Fever/Chills, Chest Pain, SOB, N/V, Calf Pain. SCDs on but not turned on. Vitals:  Vitals:    09/15/19 1145 09/15/19 1200 09/15/19 1215 09/15/19 1551   BP: 109/60 105/63 114/60 107/65   Pulse: 75 72 88 89   Resp: 14 14 18 14   Temp:    98.9 °F (37.2 °C)   TempSrc:    Oral   SpO2: 100% 100% 100% 100%   Weight:       Height:             Intake/Output:   Last Shift: I/O last 3 completed shifts: In: 2714.6 [I.V.:2714.6]  Out: 6363 [Urine:1155; Blood:30]  Current Shift: No intake/output data recorded.   975mL out in last 3 hrs ~ 375mL/hr      Physical Exam:  General:  no apparent distress, alert and cooperative  Neurologic:  alert, oriented, normal speech, no focal findings or movement disorder noted  Lungs:  No increased work of breathing, good air exchange, clear to auscultation bilaterally, no crackles or wheezing  Heart:  regular rate and rhythm and no murmur    Abdomen: soft, non-distended, appropriate tenderness, no CVA tenderness, hypoactive bowel sounds  Incision: clean, dry, intact and dermabond   Extremities:  no calf tenderness, non edematous, SCDs on but not hooked up to machine, RN informed     Lab:  Recent Results (from the past 12 hour(s))   Pregnancy, Urine    Collection Time: 09/15/19  7:45 AM   Result Value Ref Range    HCG(Urine) Pregnancy Test NEGATIVE NEGATIVE       Assessment/Plan:  Hai Bowers 19 y.o. female G0, POD #0 s/p exploratory laparotomy, right oophorectomy
postoperatively for 900 ml, per RN report, patient voided twice in the toilet overnight that was not measured. - IVF: D5 1/2 NS w/20 mEq of KCL at 100 ml/hr   - Pain control: Norco/Motrin   - Labs: CBC, BMP pending   - DVT Proph: Lovenox, SCDs   - Diet: General   - Encourage ambulation and use of incentive spirometer   - Path: Pending     Hypothyroidism    - Continue Synthroid 50 mcg daily       Principal Problem:    Exploratory Laparotomy, RO 9/15/19  Active Problems:    Asthma    IBS (irritable bowel syndrome)    Anxiety    Ovarian mass    Pelvic mass  Resolved Problems:    * No resolved hospital problems. *      Attending: Dr. Marycruz Beavers      Please page the resident named below with questions and concerns. Pamela Singh DO  OBGYN Resident  Pgr: [phone]  9/16/2019, 6:39 AM     Attending GYN oncology note:    Should seen and evaluated today with residents. Agree with resident's assessment as stated above. Patient will be discharged later today.
poor historian due to dementia

## 2019-09-17 LAB
SURGICAL PATHOLOGY REPORT: NORMAL
SURGICAL PATHOLOGY REPORT: NORMAL

## 2019-09-23 ENCOUNTER — TELEPHONE (OUTPATIENT)
Dept: GYNECOLOGIC ONCOLOGY | Age: 19
End: 2019-09-23

## 2019-10-03 ENCOUNTER — OFFICE VISIT (OUTPATIENT)
Dept: GYNECOLOGIC ONCOLOGY | Age: 19
End: 2019-10-03

## 2019-10-03 VITALS
SYSTOLIC BLOOD PRESSURE: 100 MMHG | DIASTOLIC BLOOD PRESSURE: 67 MMHG | TEMPERATURE: 98.2 F | WEIGHT: 144.4 LBS | HEART RATE: 83 BPM | HEIGHT: 67 IN | BODY MASS INDEX: 22.66 KG/M2

## 2019-10-03 DIAGNOSIS — N83.8 OVARIAN MASS: Primary | ICD-10-CM

## 2019-10-03 PROCEDURE — 99024 POSTOP FOLLOW-UP VISIT: CPT | Performed by: OBSTETRICS & GYNECOLOGY

## 2019-10-03 ASSESSMENT — ENCOUNTER SYMPTOMS
CONSTIPATION: 1
SHORTNESS OF BREATH: 1
EYES NEGATIVE: 1

## 2021-03-10 ENCOUNTER — HOSPITAL ENCOUNTER (OUTPATIENT)
Age: 21
Discharge: HOME | End: 2021-03-10
Payer: COMMERCIAL

## 2021-03-10 ENCOUNTER — INITIAL PRENATAL (OUTPATIENT)
Dept: OBGYN | Age: 21
End: 2021-03-10
Payer: COMMERCIAL

## 2021-03-10 ENCOUNTER — HOSPITAL ENCOUNTER (OUTPATIENT)
Age: 21
Setting detail: SPECIMEN
Discharge: HOME | End: 2021-03-10
Payer: COMMERCIAL

## 2021-03-10 VITALS — WEIGHT: 148 LBS | BODY MASS INDEX: 23.18 KG/M2

## 2021-03-10 DIAGNOSIS — Z32.01 POSITIVE URINE PREGNANCY TEST: ICD-10-CM

## 2021-03-10 DIAGNOSIS — Z34.91 ENCOUNTER FOR SUPERVISION OF NORMAL PREGNANCY IN FIRST TRIMESTER, UNSPECIFIED GRAVIDITY: ICD-10-CM

## 2021-03-10 DIAGNOSIS — N91.2 AMENORRHEA: Primary | ICD-10-CM

## 2021-03-10 DIAGNOSIS — N91.2 AMENORRHEA: ICD-10-CM

## 2021-03-10 LAB
ABO/RH: NORMAL
ABSOLUTE EOS #: 0.29 K/UL (ref 0–0.44)
ABSOLUTE IMMATURE GRANULOCYTE: <0.03 K/UL (ref 0–0.3)
ABSOLUTE LYMPH #: 1.14 K/UL (ref 1.1–3.7)
ABSOLUTE MONO #: 0.38 K/UL (ref 0.1–1.2)
AMPHETAMINE SCREEN URINE: NEGATIVE
ANTIBODY SCREEN: NEGATIVE
BARBITURATE SCREEN URINE: NEGATIVE
BASOPHILS # BLD: 1 % (ref 0–2)
BASOPHILS ABSOLUTE: 0.06 K/UL (ref 0–0.2)
BENZODIAZEPINE SCREEN, URINE: NEGATIVE
BUPRENORPHINE URINE: NEGATIVE
CANNABINOID SCREEN URINE: NEGATIVE
COCAINE METABOLITE, URINE: NEGATIVE
CONTROL: NORMAL
DIFFERENTIAL TYPE: ABNORMAL
EOSINOPHILS RELATIVE PERCENT: 4 % (ref 1–4)
HCT VFR BLD CALC: 35.6 % (ref 36.3–47.1)
HEMOGLOBIN: 11.5 G/DL (ref 11.9–15.1)
HEPATITIS B SURFACE ANTIGEN: NONREACTIVE
HEPATITIS C ANTIBODY: NONREACTIVE
HIV AG/AB: NONREACTIVE
IMMATURE GRANULOCYTES: 0 %
LYMPHOCYTES # BLD: 17 % (ref 24–43)
MCH RBC QN AUTO: 28.8 PG (ref 25.2–33.5)
MCHC RBC AUTO-ENTMCNC: 32.3 G/DL (ref 28.4–34.8)
MCV RBC AUTO: 89 FL (ref 82.6–102.9)
MDMA URINE: NORMAL
METHADONE SCREEN, URINE: NEGATIVE
METHAMPHETAMINE, URINE: NEGATIVE
MONOCYTES # BLD: 6 % (ref 3–12)
NRBC AUTOMATED: 0 PER 100 WBC
OPIATES, URINE: NEGATIVE
OXYCODONE SCREEN URINE: NEGATIVE
PDW BLD-RTO: 12.4 % (ref 11.8–14.4)
PHENCYCLIDINE, URINE: NEGATIVE
PLATELET # BLD: 192 K/UL (ref 138–453)
PLATELET ESTIMATE: ABNORMAL
PMV BLD AUTO: 9.6 FL (ref 8.1–13.5)
PREGNANCY TEST URINE, POC: POSITIVE
PROPOXYPHENE, URINE: NEGATIVE
RBC # BLD: 4 M/UL (ref 3.95–5.11)
RBC # BLD: ABNORMAL 10*6/UL
RUBV IGG SER QL: 7.9 IU/ML
SEG NEUTROPHILS: 72 % (ref 36–65)
SEGMENTED NEUTROPHILS ABSOLUTE COUNT: 4.68 K/UL (ref 1.5–8.1)
T. PALLIDUM, IGG: NONREACTIVE
TEST INFORMATION: NORMAL
TRICYCLIC ANTIDEPRESSANTS, UR: NEGATIVE
WBC # BLD: 6.6 K/UL (ref 3.5–11.3)
WBC # BLD: ABNORMAL 10*3/UL

## 2021-03-10 PROCEDURE — 87340 HEPATITIS B SURFACE AG IA: CPT

## 2021-03-10 PROCEDURE — 36415 COLL VENOUS BLD VENIPUNCTURE: CPT

## 2021-03-10 PROCEDURE — 80306 DRUG TEST PRSMV INSTRMNT: CPT

## 2021-03-10 PROCEDURE — 85025 COMPLETE CBC W/AUTO DIFF WBC: CPT

## 2021-03-10 PROCEDURE — 86803 HEPATITIS C AB TEST: CPT

## 2021-03-10 PROCEDURE — 86900 BLOOD TYPING SEROLOGIC ABO: CPT

## 2021-03-10 PROCEDURE — 87389 HIV-1 AG W/HIV-1&-2 AB AG IA: CPT

## 2021-03-10 PROCEDURE — 87086 URINE CULTURE/COLONY COUNT: CPT

## 2021-03-10 PROCEDURE — 87591 N.GONORRHOEAE DNA AMP PROB: CPT

## 2021-03-10 PROCEDURE — 86850 RBC ANTIBODY SCREEN: CPT

## 2021-03-10 PROCEDURE — 87186 SC STD MICRODIL/AGAR DIL: CPT

## 2021-03-10 PROCEDURE — 0500F INITIAL PRENATAL CARE VISIT: CPT | Performed by: OBSTETRICS & GYNECOLOGY

## 2021-03-10 PROCEDURE — 86780 TREPONEMA PALLIDUM: CPT

## 2021-03-10 PROCEDURE — 86901 BLOOD TYPING SEROLOGIC RH(D): CPT

## 2021-03-10 PROCEDURE — 87077 CULTURE AEROBIC IDENTIFY: CPT

## 2021-03-10 PROCEDURE — 87491 CHLMYD TRACH DNA AMP PROBE: CPT

## 2021-03-10 PROCEDURE — 86762 RUBELLA ANTIBODY: CPT

## 2021-03-10 RX ORDER — LORATADINE 10 MG/1
10 CAPSULE, LIQUID FILLED ORAL DAILY
COMMUNITY

## 2021-03-10 ASSESSMENT — PATIENT HEALTH QUESTIONNAIRE - PHQ9
SUM OF ALL RESPONSES TO PHQ9 QUESTIONS 1 & 2: 0
1. LITTLE INTEREST OR PLEASURE IN DOING THINGS: 0
SUM OF ALL RESPONSES TO PHQ QUESTIONS 1-9: 0
2. FEELING DOWN, DEPRESSED OR HOPELESS: 0
SUM OF ALL RESPONSES TO PHQ QUESTIONS 1-9: 0

## 2021-03-10 NOTE — PROGRESS NOTES
New OB Visit    Date of service: 3/10/2021    Cherelle Beckett  Is a 23 y.o. single female presenting for a New OB visit with Nurse. Name of Father of Debi Oliva is Aislinn Powell and is involved. 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 Pt is not Fertility pt. PT's PCP is: Danny Mehta DO     : 2000                                             Subjective:       Patient's last menstrual period was 2021 (approximate). OB History    Para Term  AB Living   1             SAB TAB Ectopic Molar Multiple Live Births                    # Outcome Date GA Lbr Kyle/2nd Weight Sex Delivery Anes PTL Lv   1 Current                      Social History     Tobacco Use   Smoking Status Never Smoker   Smokeless Tobacco Never Used        Social History     Substance and Sexual Activity   Alcohol Use Not Currently        Allergies: Patient has no known allergies. Current Outpatient Medications:     VENTOLIN  (90 Base) MCG/ACT inhaler, , Disp: , Rfl:     Prenatal Vit-Fe Fumarate-FA (PRENATAL VITAMIN PO), Take by mouth, Disp: , Rfl:     loratadine (CLARITIN) 10 MG capsule, Take 10 mg by mouth daily, Disp: , Rfl:     acetaminophen (TYLENOL) 325 MG tablet, Take 650 mg by mouth every 4 hours as needed for Pain, Disp: , Rfl:       Vital Signs Last menstrual period 2021. No results found for this visit on 03/10/21. Pain: headaches - more so in morning                            Nausea: yes      Vomiting: yes        Breast enlargement or tenderness: yes    Frequency of urination:yes      Fatigue: no         Patient history reviewed. Educational materials given and genetic testing reviewed.       Breastfeeding handouts given and reviewed: Yes     If BMI over 35 was HgA1C drawn: no      If history of thyroid problem was TSH drawn: no       My chart set up and activated: Yes    If history of preeclampsia did we start baby ASA: N/A    If history of  delivery - did we set up progesterone injections:  N/A    Does pt have a history of DVT? no  If yes start Lovenox 40 mg daily    Is pt allergic to PCN? No: NKDA  If yes order U/A to culture and sensitivity if positive. Education:  There are no Patient Instructions on file for this visit. Assessment:      Diagnosis Orders   1. Amenorrhea  C.trachomatis N.gonorrhoeae DNA, Urine    Culture, Urine    Hepatitis C Antibody    HIV Screen    POCT urine pregnancy    PRENATAL PROFILE I    Prenatal type and screen    Urine Drug Screen, Comprehensive   2. Positive urine pregnancy test  C.trachomatis N.gonorrhoeae DNA, Urine    Culture, Urine    Hepatitis C Antibody    HIV Screen    POCT urine pregnancy    PRENATAL PROFILE I    Prenatal type and screen    Urine Drug Screen, Comprehensive   3.  Encounter for supervision of normal pregnancy in first trimester, unspecified   C.trachomatis N.gonorrhoeae DNA, Urine    Culture, Urine    Hepatitis C Antibody    HIV Screen    POCT urine pregnancy    PRENATAL PROFILE I    Prenatal type and screen    Urine Drug Screen, Comprehensive       Plan: Order Routine Prenatal Lab Yes     Order additional testing if requested         Order Prenatal Vitamins   No: OTC             Appointment with Dr. Pennington Memos in 1 week for Dating U/S and total body exam if indicated      Nurse:   Janet Cochran MA

## 2021-03-11 LAB
CULTURE: ABNORMAL
Lab: ABNORMAL
SPECIMEN DESCRIPTION: ABNORMAL

## 2021-03-12 LAB
C. TRACHOMATIS DNA ,URINE: NEGATIVE
N. GONORRHOEAE DNA, URINE: NEGATIVE
SPECIMEN DESCRIPTION: NORMAL

## 2021-03-17 RX ORDER — CEPHALEXIN 500 MG/1
500 CAPSULE ORAL 2 TIMES DAILY
Qty: 14 CAPSULE | Refills: 0 | Status: SHIPPED | OUTPATIENT
Start: 2021-03-17 | End: 2021-03-24

## 2021-04-13 ENCOUNTER — HOSPITAL ENCOUNTER (OUTPATIENT)
Age: 21
Setting detail: SPECIMEN
Discharge: HOME | End: 2021-04-13
Payer: COMMERCIAL

## 2021-04-13 ENCOUNTER — ROUTINE PRENATAL (OUTPATIENT)
Dept: OBGYN | Age: 21
End: 2021-04-13
Payer: COMMERCIAL

## 2021-04-13 VITALS — SYSTOLIC BLOOD PRESSURE: 108 MMHG | BODY MASS INDEX: 22.71 KG/M2 | WEIGHT: 145 LBS | DIASTOLIC BLOOD PRESSURE: 64 MMHG

## 2021-04-13 DIAGNOSIS — Z3A.13 13 WEEKS GESTATION OF PREGNANCY: Primary | ICD-10-CM

## 2021-04-13 DIAGNOSIS — Z3A.13 13 WEEKS GESTATION OF PREGNANCY: ICD-10-CM

## 2021-04-13 PROCEDURE — G0145 SCR C/V CYTO,THINLAYER,RESCR: HCPCS

## 2021-04-13 PROCEDURE — 87491 CHLMYD TRACH DNA AMP PROBE: CPT

## 2021-04-13 PROCEDURE — 0501F PRENATAL FLOW SHEET: CPT | Performed by: OBSTETRICS & GYNECOLOGY

## 2021-04-13 PROCEDURE — 87591 N.GONORRHOEAE DNA AMP PROB: CPT

## 2021-04-13 RX ORDER — FLUTICASONE PROPIONATE 44 MCG
AEROSOL WITH ADAPTER (GRAM) INHALATION
COMMUNITY
Start: 2021-04-12

## 2021-04-13 NOTE — PATIENT INSTRUCTIONS
Patient Education        Weeks 10 to 14 of Your Pregnancy: Care Instructions  Overview     By weeks 10 to 15 of your pregnancy, the placenta has formed inside your uterus. The placenta's main job is to give your baby oxygen and nutrients through the umbilical cord. It's possible to hear your baby's heartbeat with a special ultrasound device. Your baby's organs are developing. The arms and legs can bend. This is a good time to think about testing for birth defects. There are two types of tests: screening and diagnostic. Screening tests show the chance that a baby has a certain birth defect. They can't tell you for sure that your baby has a problem. Diagnostic tests show if a baby has a certain birth defect. It's your choice whether to have these tests. You and your partner can talk to your doctor or midwife about tests for birth defects. Follow-up care is a key part of your treatment and safety. Be sure to make and go to all appointments, and call your doctor if you are having problems. It's also a good idea to know your test results and keep a list of the medicines you take. How can you care for yourself at home? Decide about tests  · You can have screening tests and diagnostic tests to check for birth defects. The decision to have a test for birth defects is personal. Think about your age, your chance of passing on a family disease, your need to know about any problems, and what you might do after you have the test results. ? Quadruple (quad) blood test. This screening test can be done between 15 and 22 weeks of pregnancy. It checks the amount of four substances in your blood. The doctor looks at these test results, along with your age and other factors, to find out the chance that your baby may have certain problems. ? Amniocentesis. This diagnostic test is used to look for chromosomal problems in the baby's cells.  It can be done between 15 and 20 weeks of pregnancy, usually around week 16.  ? Nuchal translucency test. This test uses ultrasound to measure the thickness of the area at the back of the baby's neck. An increase in the thickness can be an early sign of Down syndrome. ? Chorionic villus sampling (CVS). This is a test that looks for certain genetic problems with your baby. The same genes that are in your baby are in the placenta. A small piece of the placenta is taken out and tested. This test is done when you are 10 to 13 weeks pregnant. Ease discomfort  · Slow down and take naps when you feel tired. · If your emotions swing, talk to someone. · If your gums bleed, try a softer toothbrush. If your gums are puffy and bleed a lot, see your dentist.  · If you feel dizzy:  ? Get up slowly after sitting or lying down. ? Drink plenty of fluids. ? Eat small snacks to keep your blood sugar stable. ? Put your head between your legs as though you were tying your shoelaces. ? Lie down with your legs higher than your head. Use pillows to prop up your feet. · If you have a headache:  ? Lie down. ? Ask your partner or a good friend for a neck massage. ? Try cool cloths over your forehead or across the back of your neck. ? Use acetaminophen (Tylenol) for pain relief. Do not use nonsteroidal anti-inflammatory drugs (NSAIDs), such as ibuprofen (Advil, Motrin) or naproxen (Aleve), unless your doctor says it is okay. · If you have a nosebleed, pinch your nose gently, and hold it for a short while. To prevent nosebleeds, try massaging a small dab of petroleum jelly, such as Vaseline, in your nostrils. · If your nose is stuffed up, try saline (saltwater) nose sprays. Do not use decongestant sprays. Care for your breasts  · Wear a bra that gives you good support. · Know that changes in your breasts are normal.  ? Your breasts may get larger and more tender. Tenderness usually gets better by 12 weeks. ? Your nipples may get darker and larger, and small bumps around your nipples may show more. ?  The veins in your chest and breasts may show more. Where can you learn more? Go to https://chpepiceweb.[email or web address] org and sign in to your [email or web address] account. Enter A073 in the Intern box to learn more about \"Weeks 10 to 14 of Your Pregnancy: Care Instructions. \"     If you do not have an account, please click on the \"Sign Up Now\" link. Current as of: October 8, 2020               Content Version: 12.8  © 2006-2021 Healthwise, Incorporated. Care instructions adapted under license by [facility] ([facility]). If you have questions about a medical condition or this instruction, always ask your healthcare professional. [address] any warranty or liability for your use of this information.

## 2021-04-13 NOTE — PROGRESS NOTES
Isabel Licea is here at 13w0d for:    Chief Complaint   Patient presents with    Routine Prenatal Visit     Patient here for routine ob visit and TBE; patient c/o nausea and migraines       Estimated Due Date: Estimated Date of Delivery: 10/19/21    OB History    Para Term  AB Living   1             SAB TAB Ectopic Molar Multiple Live Births                    # Outcome Date GA Lbr Kyle/2nd Weight Sex Delivery Anes PTL Lv   1 Current                 Past Medical History:   Diagnosis Date    Asthma     Asthma     Complication of anesthesia     Nausea    Mental disorder     anxiety       Past Surgical History:   Procedure Laterality Date    ABDOMINAL EXPLORATION SURGERY  09/15/2019    RT Oophorectomy    LAPAROSCOPY Left 9/15/2019    EXPLORATORY LAPAROTOMY, RIGHT OOPHRECTOMY performed by Emily Amor MD at [facility] History     Tobacco Use   Smoking Status Never Smoker   Smokeless Tobacco Never Used        Social History     Substance and Sexual Activity   Alcohol Use Not Currently       No results found for this visit on 21. Vitals:  /64   Wt 145 lb (65.8 kg)   LMP 2021 (Approximate)   BMI 22.71 kg/m²   Estimated body mass index is 22.71 kg/m² as calculated from the following:    Height as of 10/3/19: 5' 7\" (1.702 m). Weight as of this encounter: 145 lb (65.8 kg). HPI: here for TBE - having headaches and nausea; has allergies and is taking claritin    Yes PT denies fever, chills, nausea and vomiting       Abdomen: enlarged, gravid     Results reviewed today:    No results found for this visit on 21. See prenatal vital sign section and fetal assessment section    ASSESSMENT & Plan    Diagnosis Orders   1. 13 weeks gestation of pregnancy  PAP SMEAR    C.trachomatis N.gonorrhoeae DNA, Thin Prep             I am having Maritza Moscoso maintain her acetaminophen, Ventolin HFA, Prenatal Vit-Fe Fumarate-FA (PRENATAL VITAMIN PO), loratadine, and Flovent HFA. Return in about 4 weeks (around 5/11/2021) for ob. Patient Instructions     Patient Education        Weeks 10 to 14 of Your Pregnancy: Care Instructions  Overview     By weeks 10 to 14 of your pregnancy, the placenta has formed inside your uterus. The placenta's main job is to give your baby oxygen and nutrients through the umbilical cord. It's possible to hear your baby's heartbeat with a special ultrasound device. Your baby's organs are developing. The arms and legs can bend. This is a good time to think about testing for birth defects. There are two types of tests: screening and diagnostic. Screening tests show the chance that a baby has a certain birth defect. They can't tell you for sure that your baby has a problem. Diagnostic tests show if a baby has a certain birth defect. It's your choice whether to have these tests. You and your partner can talk to your doctor or midwife about tests for birth defects. Follow-up care is a key part of your treatment and safety. Be sure to make and go to all appointments, and call your doctor if you are having problems. It's also a good idea to know your test results and keep a list of the medicines you take. How can you care for yourself at home? Decide about tests  · You can have screening tests and diagnostic tests to check for birth defects. The decision to have a test for birth defects is personal. Think about your age, your chance of passing on a family disease, your need to know about any problems, and what you might do after you have the test results. ? Quadruple (quad) blood test. This screening test can be done between 15 and 22 weeks of pregnancy. It checks the amount of four substances in your blood. The doctor looks at these test results, along with your age and other factors, to find out the chance that your baby may have certain problems. ? Amniocentesis.  This diagnostic test is used to look for chromosomal problems in the baby's cells. It can be done between 15 and 20 weeks of pregnancy, usually around week 16.  ? Nuchal translucency test. This test uses ultrasound to measure the thickness of the area at the back of the baby's neck. An increase in the thickness can be an early sign of Down syndrome. ? Chorionic villus sampling (CVS). This is a test that looks for certain genetic problems with your baby. The same genes that are in your baby are in the placenta. A small piece of the placenta is taken out and tested. This test is done when you are 10 to 13 weeks pregnant. Ease discomfort  · Slow down and take naps when you feel tired. · If your emotions swing, talk to someone. · If your gums bleed, try a softer toothbrush. If your gums are puffy and bleed a lot, see your dentist.  · If you feel dizzy:  ? Get up slowly after sitting or lying down. ? Drink plenty of fluids. ? Eat small snacks to keep your blood sugar stable. ? Put your head between your legs as though you were tying your shoelaces. ? Lie down with your legs higher than your head. Use pillows to prop up your feet. · If you have a headache:  ? Lie down. ? Ask your partner or a good friend for a neck massage. ? Try cool cloths over your forehead or across the back of your neck. ? Use acetaminophen (Tylenol) for pain relief. Do not use nonsteroidal anti-inflammatory drugs (NSAIDs), such as ibuprofen (Advil, Motrin) or naproxen (Aleve), unless your doctor says it is okay. · If you have a nosebleed, pinch your nose gently, and hold it for a short while. To prevent nosebleeds, try massaging a small dab of petroleum jelly, such as Vaseline, in your nostrils. · If your nose is stuffed up, try saline (saltwater) nose sprays. Do not use decongestant sprays. Care for your breasts  · Wear a bra that gives you good support. · Know that changes in your breasts are normal.  ? Your breasts may get larger and more tender. Tenderness usually gets better by 12 weeks.   ? Your nipples may get darker and larger, and small bumps around your nipples may show more. ? The veins in your chest and breasts may show more. Where can you learn more? Go to https://Table8pepiceweb.health3scale. org and sign in to your Tiendeo account. Enter S139 in the [facility] box to learn more about \"Weeks 10 to 14 of Your Pregnancy: Care Instructions. \"     If you do not have an account, please click on the \"Sign Up Now\" link. Current as of: October 8, 2020               Content Version: 12.8  © 1978-9228 Healthwise, Incorporated. Care instructions adapted under license by [facility] ([facility]). If you have questions about a medical condition or this instruction, always ask your healthcare professional. [address] any warranty or liability for your use of this information.                    Chan Staples,4/13/2021 4:49 PM

## 2021-04-15 LAB
CHLAMYDIA BY THIN PREP: NEGATIVE
N. GONORRHOEAE DNA, THIN PREP: NEGATIVE
SPECIMEN DESCRIPTION: NORMAL

## 2021-04-16 LAB — CYTOLOGY REPORT: NORMAL

## 2021-04-19 ENCOUNTER — PATIENT MESSAGE (OUTPATIENT)
Dept: OBGYN CLINIC | Age: 21
End: 2021-04-19

## 2021-04-19 NOTE — RESULT ENCOUNTER NOTE
[employer] message sent to patient with her results and recommendations. She is to call if having symptoms.

## 2021-04-26 RX ORDER — FLUCONAZOLE 150 MG/1
150 TABLET ORAL ONCE
Qty: 1 TABLET | Refills: 0 | Status: SHIPPED | OUTPATIENT
Start: 2021-04-26 | End: 2021-04-26

## 2021-05-11 ENCOUNTER — ROUTINE PRENATAL (OUTPATIENT)
Dept: OBGYN | Age: 21
End: 2021-05-11
Payer: COMMERCIAL

## 2021-05-11 VITALS — SYSTOLIC BLOOD PRESSURE: 98 MMHG | WEIGHT: 149.4 LBS | DIASTOLIC BLOOD PRESSURE: 64 MMHG | BODY MASS INDEX: 23.4 KG/M2

## 2021-05-11 DIAGNOSIS — Z3A.17 17 WEEKS GESTATION OF PREGNANCY: Primary | ICD-10-CM

## 2021-05-11 PROCEDURE — 0502F SUBSEQUENT PRENATAL CARE: CPT | Performed by: OBSTETRICS & GYNECOLOGY

## 2021-05-11 NOTE — PATIENT INSTRUCTIONS

## 2021-05-11 NOTE — PROGRESS NOTES
Kaila Britt is here at 17w0d for:    Chief Complaint   Patient presents with    Routine Prenatal Visit     Here for OB4       Estimated Due Date: Estimated Date of Delivery: 10/19/21    OB History    Para Term  AB Living   1             SAB TAB Ectopic Molar Multiple Live Births                    # Outcome Date GA Lbr Kyle/2nd Weight Sex Delivery Anes PTL Lv   1 Current                 Past Medical History:   Diagnosis Date    Asthma     Asthma     Complication of anesthesia     Nausea    Mental disorder     anxiety       Past Surgical History:   Procedure Laterality Date    ABDOMINAL EXPLORATION SURGERY  09/15/2019    RT Oophorectomy    LAPAROSCOPY Left 9/15/2019    EXPLORATORY LAPAROTOMY, RIGHT OOPHRECTOMY performed by Nereida Andrade MD at [address] [facility] History     Tobacco Use   Smoking Status Never Smoker   Smokeless Tobacco Never Used        Social History     Substance and Sexual Activity   Alcohol Use Not Currently       No results found for this visit on 21. Vitals:  BP 98/64   Wt 149 lb 6.4 oz (67.8 kg)   LMP 2021 (Approximate)   BMI 23.40 kg/m²   Estimated body mass index is 23.4 kg/m² as calculated from the following:    Height as of 10/3/19: 5' 7\" (1.702 m). Weight as of this encounter: 149 lb 6.4 oz (67.8 kg). HPI: Here for routine pnv    Yes PT denies fever, chills, nausea and vomiting       Abdomen: enlarged, gravid     Results reviewed today:    No results found for this visit on 21. See prenatal vital sign section and fetal assessment section    ASSESSMENT & Plan    Diagnosis Orders   1. 17 weeks gestation of pregnancy               I am having Maritza Moscoso maintain her acetaminophen, Ventolin HFA, Prenatal Vit-Fe Fumarate-FA (PRENATAL VITAMIN PO), loratadine, and Flovent HFA. Return in about 4 weeks (around 2021) for ob, usn.     Patient Instructions     Patient Education        Weeks 14 to 25 of Your Pregnancy: Care Instructions  Your Care Instructions     During this time, you may start to \"show,\" so that you look pregnant to people around you. You may also notice some changes in your skin, such as itchy spots on your palms or acne on your face. Your baby is now able to pass urine, and your baby's first stool (meconium) is starting to collect in his or her intestines. Hair is also beginning to grow on your baby's head. At your next visit, between weeks 18 and 20, your doctor may do an ultrasound test. The test allows your doctor to check for certain problems. Your doctor can also tell the sex of your baby. This is a good time to think about whether you want to know whether your baby is a boy or a girl. Talk to your doctor about getting a flu shot to help keep you healthy during your pregnancy. As your pregnancy moves along, it is common to worry or feel anxious. Your body is changing a lot. And you are thinking about giving birth, the health of your baby, and becoming a parent. You can learn to cope with any anxiety and stress you feel. Follow-up care is a key part of your treatment and safety. Be sure to make and go to all appointments, and call your doctor if you are having problems. It's also a good idea to know your test results and keep a list of the medicines you take. How can you care for yourself at home? Reduce stress    · Ask for help with cooking and housekeeping.     · Figure out who or what causes your stress. Avoid these people or situations as much as possible.     · Relax every day. Taking 10- to 15-minute breaks can make a big difference. Take a walk, listen to music, or take a warm bath.     · Learn relaxation techniques at prenatal or yoga class. Or buy a relaxation tape.     · List your fears about having a baby and becoming a parent. Share the list with someone you trust. Decide which worries are really small, and try to let them go.    Exercise    · If you did not exercise much before pregnancy, start slowly. Walking is best. [employer], and do a little more every day.     · Brisk walking, easy jogging, low-impact aerobics, water aerobics, and yoga are good choices. Some sports, such as scuba diving, horseback riding, downhill skiing, gymnastics, and water skiing, are not a good idea.     · Try to do at least 2½ hours a week of moderate exercise, such as a fast walk. One way to do this is to be active 30 minutes a day, at least 5 days a week.     · Wear loose clothing. And wear shoes and a bra that provide good support.     · Warm up and cool down to start and finish your exercise.     · If you want to use weights, be sure to use light weights. They reduce stress on your joints. Stay at the best weight for you    · Experts recommend that you gain about 1 pound a month during the first 3 months of your pregnancy.     · Experts recommend that you gain about 1 pound a week during your last 6 months of pregnancy, for a total weight gain of 25 to 35 pounds.     · If you are underweight, you will need to gain more weight (about 28 to 40 pounds).     · If you are overweight, you may not need to gain as much weight (about 15 to 25 pounds).     · If you are gaining weight too fast, use common sense. Exercise every day, and limit sweets, fast foods, and fats. Choose lean meats, fruits, and vegetables.     · If you are having twins or more, your doctor may refer you to a dietitian. Where can you learn more? Go to [email or web address]://Liquid [email or web address].[email or web address] org and sign in to your LifeWave account. Enter D635 in the [facility] box to learn more about \"Weeks 14 to 18 of Your Pregnancy: Care Instructions. \"     If you do not have an account, please click on the \"Sign Up Now\" link. Current as of: October 8, 2020               Content Version: 12.8  © 8393-4394 Healthwise, Incorporated. Care instructions adapted under license by [facility] ([facility]).  If you have questions about a medical condition or this instruction, always ask your healthcare professional. [place] [address] any warranty or liability for your use of this information.                    Alesha Ayala 3:47 PM

## 2021-06-07 DIAGNOSIS — Z3A.20 20 WEEKS GESTATION OF PREGNANCY: Primary | ICD-10-CM

## 2021-06-08 ENCOUNTER — ROUTINE PRENATAL (OUTPATIENT)
Dept: OBGYN | Age: 21
End: 2021-06-08
Payer: COMMERCIAL

## 2021-06-08 ENCOUNTER — HOSPITAL ENCOUNTER (OUTPATIENT)
Dept: ULTRASOUND IMAGING | Age: 21
Discharge: HOME | End: 2021-06-10
Payer: COMMERCIAL

## 2021-06-08 VITALS — DIASTOLIC BLOOD PRESSURE: 62 MMHG | WEIGHT: 157 LBS | SYSTOLIC BLOOD PRESSURE: 102 MMHG | BODY MASS INDEX: 24.59 KG/M2

## 2021-06-08 DIAGNOSIS — Z3A.21 21 WEEKS GESTATION OF PREGNANCY: Primary | ICD-10-CM

## 2021-06-08 DIAGNOSIS — Z3A.20 20 WEEKS GESTATION OF PREGNANCY: ICD-10-CM

## 2021-06-08 PROCEDURE — 0502F SUBSEQUENT PRENATAL CARE: CPT | Performed by: OBSTETRICS & GYNECOLOGY

## 2021-06-08 PROCEDURE — 76805 OB US >/= 14 WKS SNGL FETUS: CPT

## 2021-06-08 NOTE — PROGRESS NOTES
Guanaco Acevedo is here at 21w0d for:    Chief Complaint   Patient presents with    Routine Prenatal Visit     pt presents for 20 week us review - done at 3pm in radiology - urine showed large ketones - pt states she is feeling good       Estimated Due Date: Estimated Date of Delivery: 10/19/21    OB History    Para Term  AB Living   1             SAB TAB Ectopic Molar Multiple Live Births                    # Outcome Date GA Lbr Kyle/2nd Weight Sex Delivery Anes PTL Lv   1 Current                 Past Medical History:   Diagnosis Date    Asthma     Asthma     Complication of anesthesia     Nausea    Mental disorder     anxiety       Past Surgical History:   Procedure Laterality Date    ABDOMINAL EXPLORATION SURGERY  09/15/2019    RT Oophorectomy    LAPAROSCOPY Left 9/15/2019    EXPLORATORY LAPAROTOMY, RIGHT OOPHRECTOMY performed by Mario Sahu MD at [address] [place] [address] History     Tobacco Use   Smoking Status Never Smoker   Smokeless Tobacco Never Used        Social History     Substance and Sexual Activity   Alcohol Use Not Currently       No results found for this visit on 21. Vitals:  /62   Wt 157 lb (71.2 kg)   LMP 2021 (Approximate)   BMI 24.59 kg/m²   Estimated body mass index is 24.59 kg/m² as calculated from the following:    Height as of 10/3/19: 5' 7\" (1.702 m). Weight as of this encounter: 157 lb (71.2 kg). HPI: here for routine pnv     Yes PT denies fever, chills, nausea and vomiting       Abdomen: enlarged, gravid     Results reviewed today:    No results found for this visit on 21. See prenatal vital sign section and fetal assessment section    ASSESSMENT & Plan    Diagnosis Orders   1. 21 weeks gestation of pregnancy               I am having Maritzafavian MICHAEL Moscoso maintain her acetaminophen, Ventolin HFA, Prenatal Vit-Fe Fumarate-FA (PRENATAL VITAMIN PO), loratadine, and Flovent HFA.     Return in about 4 weeks (around 7/6/2021) for ob. There are no Patient Instructions on file for this visit.           Marito Hartmann DO,6/8/2021 5:19 PM

## 2021-07-03 ENCOUNTER — APPOINTMENT (OUTPATIENT)
Dept: ULTRASOUND IMAGING | Age: 21
End: 2021-07-03
Payer: COMMERCIAL

## 2021-07-03 ENCOUNTER — HOSPITAL ENCOUNTER (OUTPATIENT)
Age: 21
Discharge: HOME | End: 2021-07-03
Attending: ADVANCED PRACTICE MIDWIFE | Admitting: ADVANCED PRACTICE MIDWIFE
Payer: COMMERCIAL

## 2021-07-03 ENCOUNTER — HOSPITAL ENCOUNTER (OUTPATIENT)
Age: 21
Discharge: HOME | End: 2021-07-04
Attending: OBSTETRICS & GYNECOLOGY | Admitting: OBSTETRICS & GYNECOLOGY
Payer: COMMERCIAL

## 2021-07-03 VITALS
OXYGEN SATURATION: 100 % | DIASTOLIC BLOOD PRESSURE: 73 MMHG | BODY MASS INDEX: 24.64 KG/M2 | HEIGHT: 67 IN | HEART RATE: 94 BPM | WEIGHT: 157 LBS | RESPIRATION RATE: 16 BRPM | TEMPERATURE: 97.9 F | SYSTOLIC BLOOD PRESSURE: 113 MMHG

## 2021-07-03 PROBLEM — O46.90 VAGINAL BLEEDING IN PREGNANCY: Status: ACTIVE | Noted: 2021-07-03

## 2021-07-03 PROBLEM — O44.02 PLACENTA PREVIA IN SECOND TRIMESTER: Chronic | Status: ACTIVE | Noted: 2021-07-03

## 2021-07-03 PROBLEM — R19.00 PELVIC MASS: Status: RESOLVED | Noted: 2019-09-13 | Resolved: 2021-07-03

## 2021-07-03 LAB
-: ABNORMAL
-: NORMAL
ABSOLUTE EOS #: 0.24 K/UL (ref 0–0.44)
ABSOLUTE IMMATURE GRANULOCYTE: 0.04 K/UL (ref 0–0.3)
ABSOLUTE LYMPH #: 1.26 K/UL (ref 1.1–3.7)
ABSOLUTE MONO #: 0.61 K/UL (ref 0.1–1.2)
AMORPHOUS: ABNORMAL
AMORPHOUS: NORMAL
AMPHETAMINE SCREEN URINE: NEGATIVE
BACTERIA: ABNORMAL
BACTERIA: NORMAL
BARBITURATE SCREEN URINE: NEGATIVE
BASOPHILS # BLD: 1 % (ref 0–2)
BASOPHILS ABSOLUTE: 0.05 K/UL (ref 0–0.2)
BENZODIAZEPINE SCREEN, URINE: NEGATIVE
BILIRUBIN URINE: NEGATIVE
BILIRUBIN URINE: NEGATIVE
BUPRENORPHINE URINE: NORMAL
CANNABINOID SCREEN URINE: NEGATIVE
CASTS UA: ABNORMAL /LPF (ref 0–8)
CASTS UA: NORMAL /LPF
COCAINE METABOLITE, URINE: NEGATIVE
COLOR: ABNORMAL
COLOR: YELLOW
COMMENT UA: ABNORMAL
CRYSTALS, UA: ABNORMAL /HPF
CRYSTALS, UA: NORMAL /HPF
DIFFERENTIAL TYPE: ABNORMAL
DIRECT EXAM: ABNORMAL
EOSINOPHILS RELATIVE PERCENT: 3 % (ref 1–4)
EPITHELIAL CELLS UA: ABNORMAL /HPF (ref 0–5)
EPITHELIAL CELLS UA: NORMAL /HPF (ref 0–25)
FIBRINOGEN: 542 MG/DL (ref 140–420)
GLUCOSE URINE: NEGATIVE
GLUCOSE URINE: NEGATIVE
HCT VFR BLD CALC: 32 % (ref 36.3–47.1)
HCT VFR BLD CALC: 33.6 % (ref 36.3–47.1)
HEMOGLOBIN: 10.6 G/DL (ref 11.9–15.1)
HEMOGLOBIN: 11 G/DL (ref 11.9–15.1)
IMMATURE GRANULOCYTES: 0 %
INR BLD: 0.9
KETONES, URINE: ABNORMAL
KETONES, URINE: ABNORMAL
LEUKOCYTE ESTERASE, URINE: NEGATIVE
LEUKOCYTE ESTERASE, URINE: NEGATIVE
LYMPHOCYTES # BLD: 13 % (ref 24–43)
Lab: ABNORMAL
MAGNESIUM: 4.8 MG/DL (ref 1.6–2.6)
MCH RBC QN AUTO: 30.1 PG (ref 25.2–33.5)
MCH RBC QN AUTO: 30.4 PG (ref 25.2–33.5)
MCHC RBC AUTO-ENTMCNC: 32.7 G/DL (ref 28.4–34.8)
MCHC RBC AUTO-ENTMCNC: 33.1 G/DL (ref 28.4–34.8)
MCV RBC AUTO: 91.7 FL (ref 82.6–102.9)
MCV RBC AUTO: 91.8 FL (ref 82.6–102.9)
MDMA URINE: NORMAL
METHADONE SCREEN, URINE: NEGATIVE
METHAMPHETAMINE, URINE: NORMAL
MONOCYTES # BLD: 6 % (ref 3–12)
MUCUS: ABNORMAL
MUCUS: NORMAL
NITRITE, URINE: NEGATIVE
NITRITE, URINE: POSITIVE
NRBC AUTOMATED: 0 PER 100 WBC
NRBC AUTOMATED: 0 PER 100 WBC
OPIATES, URINE: NEGATIVE
OTHER OBSERVATIONS UA: ABNORMAL
OTHER OBSERVATIONS UA: NORMAL
OXYCODONE SCREEN URINE: NEGATIVE
PARTIAL THROMBOPLASTIN TIME: 27.3 SEC (ref 20.5–30.5)
PDW BLD-RTO: 12.8 % (ref 11.8–14.4)
PDW BLD-RTO: 12.9 % (ref 11.8–14.4)
PH UA: 6.5 (ref 5–9)
PH UA: 7.5 (ref 5–8)
PHENCYCLIDINE, URINE: NEGATIVE
PLATELET # BLD: 198 K/UL (ref 138–453)
PLATELET # BLD: 221 K/UL (ref 138–453)
PLATELET ESTIMATE: ABNORMAL
PMV BLD AUTO: 9.4 FL (ref 8.1–13.5)
PMV BLD AUTO: 9.7 FL (ref 8.1–13.5)
PROPOXYPHENE, URINE: NORMAL
PROTEIN UA: ABNORMAL
PROTEIN UA: NEGATIVE
PROTHROMBIN TIME: 9.3 SEC (ref 9.1–12.3)
RBC # BLD: 3.49 M/UL (ref 3.95–5.11)
RBC # BLD: 3.66 M/UL (ref 3.95–5.11)
RBC # BLD: ABNORMAL 10*6/UL
RBC UA: ABNORMAL /HPF (ref 0–4)
RBC UA: NORMAL /HPF (ref 0–2)
RENAL EPITHELIAL, UA: ABNORMAL /HPF
RENAL EPITHELIAL, UA: NORMAL /HPF
SARS-COV-2, RAPID: NOT DETECTED
SEG NEUTROPHILS: 77 % (ref 36–65)
SEGMENTED NEUTROPHILS ABSOLUTE COUNT: 7.49 K/UL (ref 1.5–8.1)
SPECIFIC GRAVITY UA: 1.01 (ref 1–1.03)
SPECIFIC GRAVITY UA: <1.005 (ref 1.01–1.02)
SPECIMEN DESCRIPTION: ABNORMAL
SPECIMEN DESCRIPTION: NORMAL
T. PALLIDUM, IGG: NONREACTIVE
TEST INFORMATION: NORMAL
TRICHOMONAS: ABNORMAL
TRICHOMONAS: NORMAL
TRICYCLIC ANTIDEPRESSANTS, UR: NORMAL
TURBIDITY: CLEAR
TURBIDITY: CLEAR
URINE HGB: ABNORMAL
URINE HGB: ABNORMAL
UROBILINOGEN, URINE: NORMAL
UROBILINOGEN, URINE: NORMAL
WBC # BLD: 11.4 K/UL (ref 3.5–11.3)
WBC # BLD: 9.7 K/UL (ref 3.5–11.3)
WBC # BLD: ABNORMAL 10*3/UL
WBC UA: ABNORMAL /HPF (ref 0–5)
WBC UA: NORMAL /HPF (ref 0–5)
YEAST: ABNORMAL
YEAST: NORMAL

## 2021-07-03 PROCEDURE — 85610 PROTHROMBIN TIME: CPT

## 2021-07-03 PROCEDURE — 76815 OB US LIMITED FETUS(S): CPT

## 2021-07-03 PROCEDURE — 96365 THER/PROPH/DIAG IV INF INIT: CPT

## 2021-07-03 PROCEDURE — 87086 URINE CULTURE/COLONY COUNT: CPT

## 2021-07-03 PROCEDURE — 6370000000 HC RX 637 (ALT 250 FOR IP): Performed by: STUDENT IN AN ORGANIZED HEALTH CARE EDUCATION/TRAINING PROGRAM

## 2021-07-03 PROCEDURE — 87660 TRICHOMONAS VAGIN DIR PROBE: CPT

## 2021-07-03 PROCEDURE — 87591 N.GONORRHOEAE DNA AMP PROB: CPT

## 2021-07-03 PROCEDURE — 87480 CANDIDA DNA DIR PROBE: CPT

## 2021-07-03 PROCEDURE — 96366 THER/PROPH/DIAG IV INF ADDON: CPT

## 2021-07-03 PROCEDURE — 81001 URINALYSIS AUTO W/SCOPE: CPT

## 2021-07-03 PROCEDURE — 96372 THER/PROPH/DIAG INJ SC/IM: CPT

## 2021-07-03 PROCEDURE — 87077 CULTURE AEROBIC IDENTIFY: CPT

## 2021-07-03 PROCEDURE — 86920 COMPATIBILITY TEST SPIN: CPT

## 2021-07-03 PROCEDURE — 6360000002 HC RX W HCPCS: Performed by: STUDENT IN AN ORGANIZED HEALTH CARE EDUCATION/TRAINING PROGRAM

## 2021-07-03 PROCEDURE — 87635 SARS-COV-2 COVID-19 AMP PRB: CPT

## 2021-07-03 PROCEDURE — 87186 SC STD MICRODIL/AGAR DIL: CPT

## 2021-07-03 PROCEDURE — 85025 COMPLETE CBC W/AUTO DIFF WBC: CPT

## 2021-07-03 PROCEDURE — 83735 ASSAY OF MAGNESIUM: CPT

## 2021-07-03 PROCEDURE — 86850 RBC ANTIBODY SCREEN: CPT

## 2021-07-03 PROCEDURE — 80307 DRUG TEST PRSMV CHEM ANLYZR: CPT

## 2021-07-03 PROCEDURE — 85384 FIBRINOGEN ACTIVITY: CPT

## 2021-07-03 PROCEDURE — 96361 HYDRATE IV INFUSION ADD-ON: CPT

## 2021-07-03 PROCEDURE — 99217 PR OBSERVATION CARE DISCHARGE MANAGEMENT: CPT | Performed by: OBSTETRICS & GYNECOLOGY

## 2021-07-03 PROCEDURE — 86780 TREPONEMA PALLIDUM: CPT

## 2021-07-03 PROCEDURE — 85730 THROMBOPLASTIN TIME PARTIAL: CPT

## 2021-07-03 PROCEDURE — 85027 COMPLETE CBC AUTOMATED: CPT

## 2021-07-03 PROCEDURE — 86901 BLOOD TYPING SEROLOGIC RH(D): CPT

## 2021-07-03 PROCEDURE — 86900 BLOOD TYPING SEROLOGIC ABO: CPT

## 2021-07-03 PROCEDURE — 87081 CULTURE SCREEN ONLY: CPT

## 2021-07-03 PROCEDURE — 86403 PARTICLE AGGLUT ANTBDY SCRN: CPT

## 2021-07-03 PROCEDURE — 96360 HYDRATION IV INFUSION INIT: CPT

## 2021-07-03 PROCEDURE — 6360000002 HC RX W HCPCS: Performed by: OBSTETRICS & GYNECOLOGY

## 2021-07-03 PROCEDURE — 36415 COLL VENOUS BLD VENIPUNCTURE: CPT

## 2021-07-03 PROCEDURE — 87510 GARDNER VAG DNA DIR PROBE: CPT

## 2021-07-03 PROCEDURE — 87491 CHLMYD TRACH DNA AMP PROBE: CPT

## 2021-07-03 PROCEDURE — 2580000003 HC RX 258: Performed by: OBSTETRICS & GYNECOLOGY

## 2021-07-03 RX ORDER — ONDANSETRON 2 MG/ML
4 INJECTION INTRAMUSCULAR; INTRAVENOUS EVERY 6 HOURS PRN
Status: DISCONTINUED | OUTPATIENT
Start: 2021-07-03 | End: 2021-07-04 | Stop reason: HOSPADM

## 2021-07-03 RX ORDER — BETAMETHASONE SODIUM PHOSPHATE AND BETAMETHASONE ACETATE 3; 3 MG/ML; MG/ML
12 INJECTION, SUSPENSION INTRA-ARTICULAR; INTRALESIONAL; INTRAMUSCULAR; SOFT TISSUE ONCE
Status: COMPLETED | OUTPATIENT
Start: 2021-07-04 | End: 2021-07-04

## 2021-07-03 RX ORDER — SODIUM CHLORIDE 0.9 % (FLUSH) 0.9 %
10 SYRINGE (ML) INJECTION PRN
Status: DISCONTINUED | OUTPATIENT
Start: 2021-07-03 | End: 2021-07-04 | Stop reason: HOSPADM

## 2021-07-03 RX ORDER — ONDANSETRON 4 MG/1
4 TABLET, ORALLY DISINTEGRATING ORAL EVERY 8 HOURS PRN
Status: DISCONTINUED | OUTPATIENT
Start: 2021-07-03 | End: 2021-07-03 | Stop reason: HOSPADM

## 2021-07-03 RX ORDER — ONDANSETRON 2 MG/ML
4 INJECTION INTRAMUSCULAR; INTRAVENOUS EVERY 6 HOURS PRN
Status: DISCONTINUED | OUTPATIENT
Start: 2021-07-03 | End: 2021-07-03 | Stop reason: HOSPADM

## 2021-07-03 RX ORDER — ACETAMINOPHEN 325 MG/1
650 TABLET ORAL EVERY 4 HOURS PRN
Status: DISCONTINUED | OUTPATIENT
Start: 2021-07-03 | End: 2021-07-04 | Stop reason: HOSPADM

## 2021-07-03 RX ORDER — SODIUM CHLORIDE 0.9 % (FLUSH) 0.9 %
10 SYRINGE (ML) INJECTION EVERY 12 HOURS SCHEDULED
Status: DISCONTINUED | OUTPATIENT
Start: 2021-07-03 | End: 2021-07-04 | Stop reason: HOSPADM

## 2021-07-03 RX ORDER — SODIUM CHLORIDE 9 MG/ML
25 INJECTION, SOLUTION INTRAVENOUS PRN
Status: DISCONTINUED | OUTPATIENT
Start: 2021-07-03 | End: 2021-07-04 | Stop reason: HOSPADM

## 2021-07-03 RX ORDER — CALCIUM CARBONATE 200(500)MG
1000 TABLET,CHEWABLE ORAL 3 TIMES DAILY PRN
Status: DISCONTINUED | OUTPATIENT
Start: 2021-07-03 | End: 2021-07-04 | Stop reason: HOSPADM

## 2021-07-03 RX ORDER — SODIUM CHLORIDE, SODIUM LACTATE, POTASSIUM CHLORIDE, CALCIUM CHLORIDE 600; 310; 30; 20 MG/100ML; MG/100ML; MG/100ML; MG/100ML
1000 INJECTION, SOLUTION INTRAVENOUS ONCE
Status: COMPLETED | OUTPATIENT
Start: 2021-07-03 | End: 2021-07-03

## 2021-07-03 RX ORDER — FLUTICASONE PROPIONATE 44 UG/1
2 AEROSOL, METERED RESPIRATORY (INHALATION) 2 TIMES DAILY
Status: DISCONTINUED | OUTPATIENT
Start: 2021-07-03 | End: 2021-07-04 | Stop reason: HOSPADM

## 2021-07-03 RX ORDER — BUTALBITAL, ACETAMINOPHEN AND CAFFEINE 50; 325; 40 MG/1; MG/1; MG/1
1 TABLET ORAL EVERY 6 HOURS PRN
Status: DISCONTINUED | OUTPATIENT
Start: 2021-07-03 | End: 2021-07-04 | Stop reason: HOSPADM

## 2021-07-03 RX ORDER — CALCIUM GLUCONATE 94 MG/ML
1000 INJECTION, SOLUTION INTRAVENOUS PRN
Status: DISCONTINUED | OUTPATIENT
Start: 2021-07-03 | End: 2021-07-04 | Stop reason: HOSPADM

## 2021-07-03 RX ORDER — VITAMIN A, ASCORBIC ACID, CHOLECALCIFEROL, .ALPHA.-TOCOPHEROL ACETATE, DL-, THIAMINE MONONITRATE, RIBOFLAVIN, NIACINAMIDE, PYRIDOXINE HYDROCHLORIDE, FOLIC ACID, CYANOCOBALAMIN, CALCIUM CARBONATE, IRON, ZINC OXIDE, AND CUPRIC OXIDE 4000; 120; 400; 22; 1.84; 3; 20; 10; 1; 12; 200; 29; 25; 2 [IU]/1; MG/1; [IU]/1; [IU]/1; MG/1; MG/1; MG/1; MG/1; MG/1; UG/1; MG/1; MG/1; MG/1; MG/1
1 TABLET ORAL DAILY
Status: DISCONTINUED | OUTPATIENT
Start: 2021-07-03 | End: 2021-07-04 | Stop reason: HOSPADM

## 2021-07-03 RX ORDER — SODIUM CHLORIDE, SODIUM LACTATE, POTASSIUM CHLORIDE, CALCIUM CHLORIDE 600; 310; 30; 20 MG/100ML; MG/100ML; MG/100ML; MG/100ML
INJECTION, SOLUTION INTRAVENOUS CONTINUOUS
Status: DISCONTINUED | OUTPATIENT
Start: 2021-07-03 | End: 2021-07-04

## 2021-07-03 RX ORDER — SODIUM CHLORIDE, SODIUM LACTATE, POTASSIUM CHLORIDE, AND CALCIUM CHLORIDE .6; .31; .03; .02 G/100ML; G/100ML; G/100ML; G/100ML
1000 INJECTION, SOLUTION INTRAVENOUS ONCE
Status: DISCONTINUED | OUTPATIENT
Start: 2021-07-03 | End: 2021-07-04 | Stop reason: HOSPADM

## 2021-07-03 RX ORDER — BETAMETHASONE SODIUM PHOSPHATE AND BETAMETHASONE ACETATE 3; 3 MG/ML; MG/ML
12 INJECTION, SUSPENSION INTRA-ARTICULAR; INTRALESIONAL; INTRAMUSCULAR; SOFT TISSUE DAILY
Status: DISCONTINUED | OUTPATIENT
Start: 2021-07-03 | End: 2021-07-03 | Stop reason: HOSPADM

## 2021-07-03 RX ORDER — MAGNESIUM SULFATE IN WATER 40 MG/ML
4000 INJECTION, SOLUTION INTRAVENOUS ONCE
Status: COMPLETED | OUTPATIENT
Start: 2021-07-03 | End: 2021-07-03

## 2021-07-03 RX ADMIN — MAGNESIUM SULFATE HEPTAHYDRATE 2000 MG/HR: 40 INJECTION, SOLUTION INTRAVENOUS at 06:56

## 2021-07-03 RX ADMIN — BUTALBITAL, ACETAMINOPHEN AND CAFFEINE 1 TABLET: 50; 325; 40 TABLET ORAL at 23:24

## 2021-07-03 RX ADMIN — FLUCONAZOLE 150 MG: 100 TABLET ORAL at 16:25

## 2021-07-03 RX ADMIN — Medication 1 TABLET: at 16:25

## 2021-07-03 RX ADMIN — BETAMETHASONE ACETATE AND BETAMETHASONE SODIUM PHOSPHATE 12 MG: 3; 3 INJECTION, SUSPENSION INTRA-ARTICULAR; INTRALESIONAL; INTRAMUSCULAR; SOFT TISSUE at 05:40

## 2021-07-03 RX ADMIN — MAGNESIUM SULFATE HEPTAHYDRATE 4000 MG: 40 INJECTION, SOLUTION INTRAVENOUS at 06:31

## 2021-07-03 RX ADMIN — ACETAMINOPHEN 650 MG: 325 TABLET ORAL at 20:36

## 2021-07-03 RX ADMIN — SODIUM CHLORIDE, POTASSIUM CHLORIDE, SODIUM LACTATE AND CALCIUM CHLORIDE 1000 ML: 600; 310; 30; 20 INJECTION, SOLUTION INTRAVENOUS at 05:35

## 2021-07-03 RX ADMIN — MAGNESIUM SULFATE HEPTAHYDRATE 2000 MG/HR: 40 INJECTION, SOLUTION INTRAVENOUS at 16:26

## 2021-07-03 ASSESSMENT — PAIN SCALES - GENERAL
PAINLEVEL_OUTOF10: 3
PAINLEVEL_OUTOF10: 7

## 2021-07-03 NOTE — PROGRESS NOTES
lung maturity  -Started on magnesium for neuroprotection 4 g bolus, 2 g continuous for a total of 12 hours  -IVF bolus given  -Pelvic rest  -NPO  -Continuous EFM  -Pending abdominal US, see report for more details  -Spoke to Dr. Guanako Sibley at [facility]. Transfer received from L&D team.   Discussed transfer with patient and her family at the bedside. Patient accepts transfer. All questions and risks discussed. Discharged to: transfer to higher level hospital      No follow-ups on file. There are no outpatient Patient Instructions on file for this admission.

## 2021-07-03 NOTE — PROGRESS NOTES
closely   - Hgb 11.0 on admission    - S/p celestone x1, additional dose due 7/4    - Continue to monitor closely    Carlos Morales DO  Ob/Gyn Resident  7/3/2021, 4:39 PM

## 2021-07-03 NOTE — FLOWSHEET NOTE
EFM off for bedside ultrasound per Dr. Kavya Saleh. Cephalic presentation confirmed. Placenta previa noted. EFW 1 lbs 12 oz.

## 2021-07-03 NOTE — H&P
OBSTETRICAL HISTORY [facility]    Date: 7/3/2021       Time: 1:37 PM   Patient Name: Sheyla Carl     Patient : 2000  Room/Bed: 03/0703-    Admission Date/Time: 7/3/2021 11:32 AM      CC: Transfer from [place] for vaginal bleeding with partial placental previa     HPI: Sheyla Carl is a 23 y.o.  at 18w2d who presents as a transfer from [place] for vaginal bleeding and known partial placenta previa. The patient reports fetal movement is present, complains of intermittent cramping that has lessened since admission, denies loss of fluid, complains of vaginal spotting. Patient denies headache, vision changes, nausea, vomiting, fever, chills, shortness of breath, chest pain, RUQ pain, diarrhea, change in color/amount/odor of vaginal discharge, dysuria or, hematuria. DATING:  LMP: Patient's last menstrual period was 2021 (approximate).   Estimated Date of Delivery: 10/19/21   Based on: LMP confirmed by 9w3d ultrasound (pt reported MARCELO is based on approximate LMP)    PREGNANCY RISK FACTORS:  Patient Active Problem List   Diagnosis    Asthma    IBS (irritable bowel syndrome)    Ovarian mass    Exploratory Laparotomy, RSO 9/15/19    Vaginal bleeding in pregnancy    Placenta previa in second trimester    Anxiety        Steroids Given In This Pregnancy:  yes on 7/3/21    REVIEW OF SYSTEMS:   Constitutional: negative fever, negative chills, negative weight changes   HEENT: negative visual disturbances, negative headaches, negative dizziness  Breast: negative breast abnormalities, negative breast lumps, negative nipple discharge  Respiratory: negative dyspnea, negative cough, negative SOB  Cardiovascular: negative chest pain,  negative palpitations, negative arrhythmia, negative syncope   Gastrointestinal: +abdominal cramping, negative RUQ pain, negative N/V, negative diarrhea, negative constipation, negative bowel changes  Genitourinary: negative os    LIMITED BEDSIDE US:  Position: Cephalic  Placental Location: posterior possible low lying or placenta previa with hematoma noted overlying the cervix  Fetal Heart Tones: Present  Fetal Movement: Present  Amniotic Fluid Index/Volume: Adequate 2x2cm vertical pocket  Estimated Fetal Weight:  1lbs 12oz    PRENATAL LAB RESULTS:   Blood Type/Rh: A pos  Antibody Screen: negative  Hemoglobin, Hematocrit, Platelets: Hgb 09.4/YDP 35.6/Plt 192  Rubella: equivocal  T.  Pallidum, IgG: non-reactive  Hepatitis B Surface Antigen: non-reactive   Hepatitis C Antibody: non-reactive   HIV: non-reactive   Sickle Cell Screen: not done  Gonorrhea: negative  Chlamydia: negative  Urine culture: positive - Klebsiella Pneumoniae >100,000CFU, date: 3/10/21    Early 1 hour Glucose Tolerance Test: not done  Early 3 hour Glucose Tolerance Test: not done  1 hour Glucose Tolerance Test: not done  3 hour Glucose Tolerance Test: not done    Group B Strep: not done   Cystic Fibrosis Screen: not done  First Trimester Screen: not done  MSAFP/Multiple Markers: not done  Non-Invasive Prenatal Testing: not done  Anatomy US: partial placenta previa, 3VC, normal anatomy    ASSESSMENT & PLAN:  Pam Eduardo is a 23 y.o. female  at 18w2d IUP   - GBS unknown / Rh positive / R equivocal   - No indication for GBS prophylaxis as patient states cramping has lessened    Transfer of care from [place] for vaginal bleeding with known partial placenta previa   - [facility] Dr. Doris Alonzo   - CBC, TPall, T&S, PT/PTT/INR, COVID    - Urine cx   - UDS R/B/A discussed, consent obtained and in chart   -  R/B/A discussed, consent obtained and in chart   - Cat I FHT, TOCO rare contractions   - SSE: No active bleeding, external os visually closed    - BSUS: cephalic, posterior placenta with partial previa vs low lying and hematoma overlying cervix, EFW 1#12   - S/p celestone @[place] @0540, 7/3/21, will order next dose @0540, 21   - 4g Mag bolus given @ 0631, will

## 2021-07-03 NOTE — DISCHARGE SUMMARY
Obstetric Discharge Summary  [facility]    Patient Name: Ty Quinonez  Patient : 2000  Primary Care Physician: Vivi Massey DO  Admit Date: 7/3/2021    Principal Diagnosis: IUP at 24w4d, admitted as Txfr from [place] for vaginal bleeding with known partial placenta previa     Her pregnancy has been complicated by:   Patient Active Problem List   Diagnosis    Asthma    IBS (irritable bowel syndrome)    Ovarian mass    Exploratory Laparotomy, RSO 9/15/19    Vaginal bleeding in pregnancy    Placenta previa in second trimester    Anxiety    S/p Celestone 7/3 &     Hemorrhage from placenta previa, antepartum    Vaginal bleeding in pregnancy, second trimester    Subchorionic hematoma in second trimester       Infection Present?: Yes- UTI  Hospital Acquired: No    Surgical Operations & Procedures: none    Consultations: MFM    Pertinent Findings & Procedures: Ty Quinonez is a 23 y.o. female  at 18w2d admitted as Txfr from [place] for vaginal bleeding with known partial placenta previa; received Celestone x1 on 7/3/21 and Mag sulfate for neuroprotection @ [place]. NICU consulted. CS consent completed. Mag sulfate was continued until 2nd dose of celestone    HD#2 (21): 2nd celestone given. MFM scan confirmed placenta previa. Vaginal bleeding slowed. Urine culture positive for gram negative rods and received Rocephin. She was discharged home on Keflex.        Course of patient: uncomplicated    Discharge to: home    Readmission planned: no     Recommendations on Discharge:     Medications:      Medication List      START taking these medications    cephALEXin 500 MG capsule  Commonly known as: KEFLEX  Take 1 capsule by mouth 4 times daily for 6 days  Start taking on: 2021        Chas Sung taking these medications    acetaminophen 325 MG tablet  Commonly known as: TYLENOL     Flovent HFA 44 MCG/ACT inhaler  Generic drug: fluticasone     loratadine 10 MG capsule  Commonly known as: CLARITIN     PRENATAL VITAMIN PO     Ventolin  (90 Base) MCG/ACT inhaler  Generic drug: albuterol sulfate HFA           Where to Get Your Medications      These medications were sent to [facility] Shonna Redmond [phone] - F [phone]  [address] [address]    Phone: [phone]   · cephALEXin 500 MG capsule           Activity: pelvic rest  Diet: regular diet  Follow up: 1-2 weeks with primary OB/GYN    Condition on discharge: stable    Discharge date: 7/4/21    Alma Juárez DO  Ob/Gyn Resident    Comments:  Home care and follow-up care were reviewed.

## 2021-07-03 NOTE — FLOWSHEET NOTE
Patient arrives to unit via EMS with c/o vaginal bleeding upon waking up, along with cramping. Was brought into room 200 accompanied by parents. States no recent injury/sexual intercourse. Placed on monitors, vitals obtained.

## 2021-07-03 NOTE — FLOWSHEET NOTE
Report given to [facility] team, informed of all medications given and ultrasound with cervical length, bleeding presently, contraction pattern, cervix closed, FHR.

## 2021-07-03 NOTE — FLOWSHEET NOTE
Pt not tracing et sitting up high fowlers eating lunch. Will readjust EFM after she is finished eating.

## 2021-07-04 VITALS
TEMPERATURE: 97.9 F | DIASTOLIC BLOOD PRESSURE: 54 MMHG | SYSTOLIC BLOOD PRESSURE: 101 MMHG | HEART RATE: 76 BPM | OXYGEN SATURATION: 97 % | RESPIRATION RATE: 16 BRPM

## 2021-07-04 LAB
CULTURE: ABNORMAL
CULTURE: ABNORMAL
Lab: ABNORMAL
Lab: ABNORMAL
SPECIMEN DESCRIPTION: ABNORMAL
SPECIMEN DESCRIPTION: ABNORMAL

## 2021-07-04 PROCEDURE — 2580000003 HC RX 258: Performed by: STUDENT IN AN ORGANIZED HEALTH CARE EDUCATION/TRAINING PROGRAM

## 2021-07-04 PROCEDURE — 96360 HYDRATION IV INFUSION INIT: CPT

## 2021-07-04 PROCEDURE — 76817 TRANSVAGINAL US OBSTETRIC: CPT | Performed by: OBSTETRICS & GYNECOLOGY

## 2021-07-04 PROCEDURE — 94640 AIRWAY INHALATION TREATMENT: CPT

## 2021-07-04 PROCEDURE — 6360000002 HC RX W HCPCS: Performed by: STUDENT IN AN ORGANIZED HEALTH CARE EDUCATION/TRAINING PROGRAM

## 2021-07-04 PROCEDURE — 99215 OFFICE O/P EST HI 40 MIN: CPT

## 2021-07-04 PROCEDURE — 96372 THER/PROPH/DIAG INJ SC/IM: CPT

## 2021-07-04 PROCEDURE — 6370000000 HC RX 637 (ALT 250 FOR IP): Performed by: STUDENT IN AN ORGANIZED HEALTH CARE EDUCATION/TRAINING PROGRAM

## 2021-07-04 PROCEDURE — 76811 OB US DETAILED SNGL FETUS: CPT | Performed by: OBSTETRICS & GYNECOLOGY

## 2021-07-04 PROCEDURE — 96361 HYDRATE IV INFUSION ADD-ON: CPT

## 2021-07-04 RX ORDER — CEPHALEXIN 500 MG/1
500 CAPSULE ORAL 4 TIMES DAILY
Qty: 24 CAPSULE | Refills: 0 | Status: SHIPPED | OUTPATIENT
Start: 2021-07-05 | End: 2021-07-11

## 2021-07-04 RX ADMIN — SODIUM CHLORIDE, PRESERVATIVE FREE 10 ML: 5 INJECTION INTRAVENOUS at 08:32

## 2021-07-04 RX ADMIN — CEFTRIAXONE SODIUM 1000 MG: 1 INJECTION, POWDER, FOR SOLUTION INTRAMUSCULAR; INTRAVENOUS at 13:58

## 2021-07-04 RX ADMIN — SODIUM CHLORIDE, POTASSIUM CHLORIDE, SODIUM LACTATE AND CALCIUM CHLORIDE: 600; 310; 30; 20 INJECTION, SOLUTION INTRAVENOUS at 00:59

## 2021-07-04 RX ADMIN — BETAMETHASONE SODIUM PHOSPHATE AND BETAMETHASONE ACETATE 12 MG: 3; 3 INJECTION, SUSPENSION INTRA-ARTICULAR; INTRALESIONAL; INTRAMUSCULAR at 05:36

## 2021-07-04 RX ADMIN — FLUTICASONE PROPIONATE 2 PUFF: 44 AEROSOL, METERED RESPIRATORY (INHALATION) at 10:06

## 2021-07-04 RX ADMIN — MAGNESIUM SULFATE HEPTAHYDRATE 2000 MG/HR: 40 INJECTION, SOLUTION INTRAVENOUS at 01:22

## 2021-07-04 RX ADMIN — Medication 1 TABLET: at 08:31

## 2021-07-04 NOTE — PROGRESS NOTES
Resident Interval Magnesium Sulfate Note    Lisha Lynch is a 23 y.o. female  at 18w2d  The patient is resting comfortably. Patient denies any chest tightness. Patient states she continues to feel dizzy and groggy from the magnesium and is requesting coffee. She denies visual changes and reports a mild headache that is normal for her. She denies any shortness of breath. She denies change in her extremities, regarding swelling. Patient states she notices blood in her urine but that it is dark. Minimal blood noted on pads. Will give patient coffee and continue physician neuro checks q4hrs    Continuous Medications:    magnesium sulfate 2,000 mg/hr (21 1626)    lactated ringers 75 mL/hr at 21    sodium chloride         Vitals:    Vitals:    21 1600 21 1700 21 1800 21 195   BP: 98/60 97/60 107/60 105/63   Pulse: 79 89 79 76   Resp: 16 16 16 16   Temp: 97.5 °F (36.4 °C)   98.2 °F (36.8 °C)   TempSrc: Axillary      SpO2: 98% 98% 96% 99%         Fetal heart rate: Baseline Heart Rate:  135, moderate variability, accelerations: present 10x10, decelerations: absent     [place]: quiet    Physical Exam:  Chest: clear to auscultation bilaterally  Heart: RRR no murmur  Abdomen: soft, nontender, gravid, no s/s chorio or abruption  Extremities: DTR increased Right: 3+/4   Left: 3+/4  Clonus: absent    Urine Output: 200/hr; Blood Tinged urine    Labs:  Last Magnesium Level:   Lab Results   Component Value Date    MG 4.8 2021       BMP:  No results for input(s): NA, K, CL, CO2, BUN, CREATININE, GLUCOSE in the last 72 hours.     ASSESSMENT/PLAN  Lisha Lynch is a 23 y.o. female  at 18w2d with vaginal bleeding in the setting of partial placenta previa   - Continue Magnesium Sulfate Treatment 2g/hr, off @ 0630 on 21   - Mag level 4.8     - VSS, Afebrile   - Patient denies any s/s PreE   - Strict Is&Os, no vasquez catheter at this time   - UOP adequate, continued blood tinged urine noted   - Will continue to monitor bleeding closely   - Tylenol 650mg q4h PRN for headache    - Hgb 11.0 on admission    - S/p celestone x1, additional dose due 7/4    - Continue to monitor closely    Tio Perez DO  Ob/Gyn Resident  7/3/2021, 8:44 PM

## 2021-07-04 NOTE — FLOWSHEET NOTE
Pt assisted to bathroom. States \"I feel like im getting a migraine\" states she gets them frequently. Dr. Summer Charles informed.

## 2021-07-04 NOTE — PROGRESS NOTES
OB/GYN PROGRESS NOTE/Magneisum Sulfate Note    Yady Le is a 23 y.o. female  at [address] [place] [address] Day: 2    Subjective:   Patient has been seen and examined. Patient is feeling okay overall. She reports continued grogginess from the magnesium. She denies chest pain or headache. Patient denies any vaginal discharge and any urinary complaints. The patient reports fetal movement is present, denies contractions, denies loss of fluid, complains of vaginal bleeding that is dark colored and very minimal. Patient denies headache, vision changes, nausea, vomiting, fever, chills, shortness of breath, chest pain, RUQ pain, abdominal pain, diarrhea, dysuria or, hematuria. Objective:   Vitals:  Vitals:    21 0200 21 0300 21 0400 21 0500   BP: (!) 108/56 (!) 105/55 (!) 91/42 (!) 83/35   Pulse: 69 69 73 70   Resp:       Temp:       TempSrc:       SpO2: 98% 96% 96%      Patient does not have a PMHx of CHTN,  Bipolar disorder, and anxiety, She denies any history of diabetes, thyroid or asthma. She denies any history of abnormal pap smears or LEEP. She denies any history of STDs. She has an exploratory laparotomy for a benign serous adenoma. She is a nonsmoker. She denies any alcohol or other drug use. She denies a personal history of CHD (congenital heart defect). She has no family history of NTDs, birth defects like cleft lip/palate, polydactyly, severe learning disability or mental retardation, trisomy 24, 25 or 13 or other chromosomal abnormalities. She has no family history of diabetes mellitus. She has received no genetic testing this pregnancy. Patient has a known partial placenta previa in this pregnancy.      FHT: 125, moderate variability, accelerations present 10x10, decelerations absent  Contractions: none    Physical Exam:  General appearance:  no apparent distress, alert and cooperative  HEENT: head atraumatic, normocephalic, moist mucous membranes, trachea midline  Neurologic: alert, oriented, normal speech, no focal findings or movement disorder noted  Lungs:  No increased work of breathing, good air exchange, clear to auscultation bilaterally, no crackles or wheezing  Heart:  regular rate and rhythm and no murmur    Abdomen:  soft, gravid, non-tender and no rebound, guarding, or rigidity  Extremities:  no calf tenderness, non edematous, DTR's: +3/4 bilateral extremities   Musculoskeletal: Gross strength equal and intact throughout, no gross abnormalities, range of motion normal in hips, knees, shoulders and spine, CVA tenderness: none  Psychiatric: Mood appropriate, normal affect   Rectal Exam: not indicated  Pelvic Exam: not indicated at this time    Assessment/Plan:  Jana Mejia is a 23 y.o. female  at 19w9d IUP    Transfer of care from [place] for vaginal bleeding with known partial placenta previa              - Admission labs collected   - COVID neg 7/3, Coags wnl x1              - Urine cx              -  R/B/A discussed, consent obtained and in chart              - Cat I FHT, TOCO rare contractions              - SSE (7/3): No active bleeding, external os visually closed               - BSUS: cephalic, posterior placenta with partial previa vs low lying and hematoma overlying cervix, EFW 1#12              - S/p celestone @[place] @0540, 7/3/21, next dose @0540, 21              - S/p 4g mag bolus, will continue 2g/hr until 2nd dose of celestone and re-evaluate   - T&C 2U on admission               - Strict Is &Os, no vasquez catheter currently              - Vaginitis: + candida, diflucan x1 given              - PNV, SCDs, General diet              - NICU consulted and notified              - MFM scan 21 AM    Magnesium Sulfate    - Continue Magnesium Sulfate Treatment 2g/hr, off @ 0540 on 21 after 2nd dose of celestone               - Mag level 4.8                - VSS, Afebrile              - Patient denies any s/s PreE              - Strict Is&Os, no

## 2021-07-04 NOTE — PROGRESS NOTES
Resident Interval Magnesium Sulfate Note    Goran Johnson is a 23 y.o. female  at 19w9d  The patient is resting comfortably. She denies visual changes, abdominal pain in the right upper quadrant and nausea. She denies any shortness of breath or chest pain. She denies change in her extremities, regarding swelling. She complains of a headache that is similar to her migraines. She has received Tylenol and Fioricet and caffeine intake. Advised patient to let us know if it does not improve. Continuous Medications:    magnesium sulfate 2,000 mg/hr (21 0122)    lactated ringers 75 mL/hr at 21 0059    sodium chloride         Vitals:    Vitals:    21 2244 21 2300 21 0000 21 0100   BP:  115/69 (!) 95/58 105/61   Pulse:  76 93 71   Resp:  18  18   Temp:       TempSrc:       SpO2: 98%  97% 98%         Fetal heart rate: Baseline Heart Rate:  125 bpm, moderate variability, accelerations: present, decelerations: absent     [place]: no contractions    Physical Exam:  Chest: clear to auscultation bilaterally  Heart: RRR no murmur  Abdomen: soft, nontender, gravid, no s/s chorio or abruption  Extremities: DTR increased Right: 3/4   Left: 3/4  Clonus: absent    Urine Output: 200 mL/hr; Blood Tinged urine    Labs:  Last Magnesium Level:   Lab Results   Component Value Date    MG 4.8 2021       BMP:  No results for input(s): NA, K, CL, CO2, BUN, CREATININE, GLUCOSE in the last 72 hours. ASSESSMENT/PLAN  Goran Johnson is a 23 y.o. female  at 19w9d   - Continue Magnesium Sulfate Treatment 2g/hr, will consider off @ 0540 on 21 with next dose of steroids   - BPs stable   - Patient denies any s/s PreE   - UOP adequate   - Strict I/Os    Senior resident updated and agreeable to plan.        Dallas England,   Ob/Gyn Resident  2021, 2:00 AM

## 2021-07-05 LAB
ABO/RH: NORMAL
ANTIBODY SCREEN: NEGATIVE
ARM BAND NUMBER: NORMAL
BLD PROD TYP BPU: NORMAL
BLD PROD TYP BPU: NORMAL
C TRACH DNA GENITAL QL NAA+PROBE: NEGATIVE
CROSSMATCH RESULT: NORMAL
CROSSMATCH RESULT: NORMAL
DISPENSE STATUS BLOOD BANK: NORMAL
DISPENSE STATUS BLOOD BANK: NORMAL
EXPIRATION DATE: NORMAL
N. GONORRHOEAE DNA: NEGATIVE
SPECIMEN DESCRIPTION: NORMAL
TRANSFUSION STATUS: NORMAL
TRANSFUSION STATUS: NORMAL
UNIT DIVISION: 0
UNIT DIVISION: 0
UNIT NUMBER: NORMAL
UNIT NUMBER: NORMAL

## 2021-07-06 ENCOUNTER — ROUTINE PRENATAL (OUTPATIENT)
Dept: OBGYN | Age: 21
End: 2021-07-06
Payer: COMMERCIAL

## 2021-07-06 VITALS — SYSTOLIC BLOOD PRESSURE: 100 MMHG | WEIGHT: 157 LBS | DIASTOLIC BLOOD PRESSURE: 62 MMHG | BODY MASS INDEX: 24.59 KG/M2

## 2021-07-06 DIAGNOSIS — O44.03 PLACENTA PREVIA IN THIRD TRIMESTER: ICD-10-CM

## 2021-07-06 DIAGNOSIS — Z3A.28 28 WEEKS GESTATION OF PREGNANCY: Primary | ICD-10-CM

## 2021-07-06 PROCEDURE — 36415 COLL VENOUS BLD VENIPUNCTURE: CPT | Performed by: OBSTETRICS & GYNECOLOGY

## 2021-07-06 PROCEDURE — 0502F SUBSEQUENT PRENATAL CARE: CPT | Performed by: OBSTETRICS & GYNECOLOGY

## 2021-07-06 NOTE — PROGRESS NOTES
Pam Eduardo is here at 25w0d for:    Chief Complaint   Patient presents with    Routine Prenatal Visit     Patient started to bleed on Saturday morning and went to the ER. She was transferred to [address] [place] [address] Dr Vaughan and seen by BENEDICTO. She states that she was told she has a placenta previa and they will continue to monitor. 1 hour GTT testing discussed to be done before next visit. Estimated Due Date: Estimated Date of Delivery: 10/19/21    OB History    Para Term  AB Living   1             SAB TAB Ectopic Molar Multiple Live Births                    # Outcome Date GA Lbr Kyle/2nd Weight Sex Delivery Anes PTL Lv   1 Current                 Past Medical History:   Diagnosis Date    Asthma     Asthma     Complication of anesthesia     Nausea    Mental disorder     anxiety    Mental disorder        Past Surgical History:   Procedure Laterality Date    ABDOMINAL EXPLORATION SURGERY  09/15/2019    RT Oophorectomy    LAPAROSCOPY Left 9/15/2019    EXPLORATORY LAPAROTOMY, RIGHT OOPHRECTOMY performed by Gisel Ayala MD at [address] [place] [address] History     Tobacco Use   Smoking Status Never Smoker   Smokeless Tobacco Never Used        Social History     Substance and Sexual Activity   Alcohol Use Not Currently       No results found for this visit on 21. Vitals:  /62   Wt 157 lb (71.2 kg)   LMP 2021 (Approximate)   BMI 24.59 kg/m²   Estimated body mass index is 24.59 kg/m² as calculated from the following:    Height as of 7/3/21: 5' 7\" (1.702 m). Weight as of this encounter: 157 lb (71.2 kg).       HPI: here for routine pnv; had bleeding over the weekend and was sent to [facility]; \"borderline previa\"; now just dark brown spotting; disc'd previa and rechecking the \"clot\" that was noted on last usn and again at 30 weeks with growth    Yes PT denies fever, chills, nausea and vomiting       Abdomen: enlarged, gravid     Results reviewed today:    No results found for this visit on 07/06/21. See prenatal vital sign section and fetal assessment section    ASSESSMENT & Plan    Diagnosis Orders   1. 28 weeks gestation of pregnancy  Glucose tolerance, 1 hour    Hemoglobin   2. Placenta previa in third trimester               I am having Maritza Moscoso maintain her acetaminophen, Ventolin HFA, Prenatal Vit-Fe Fumarate-FA (PRENATAL VITAMIN PO), loratadine, Flovent HFA, and cephALEXin. Return in about 1 week (around 7/13/2021) for usn, ob. There are no Patient Instructions on file for this visit.           SARA Arizmendi,7/1/8941 12:13 PM

## 2021-07-12 ENCOUNTER — ROUTINE PRENATAL (OUTPATIENT)
Dept: OBGYN | Age: 21
End: 2021-07-12
Payer: COMMERCIAL

## 2021-07-12 VITALS — DIASTOLIC BLOOD PRESSURE: 64 MMHG | BODY MASS INDEX: 24.9 KG/M2 | SYSTOLIC BLOOD PRESSURE: 102 MMHG | WEIGHT: 159 LBS

## 2021-07-12 DIAGNOSIS — Z3A.25 25 WEEKS GESTATION OF PREGNANCY: Primary | ICD-10-CM

## 2021-07-12 PROCEDURE — 0502F SUBSEQUENT PRENATAL CARE: CPT | Performed by: OBSTETRICS & GYNECOLOGY

## 2021-07-27 ENCOUNTER — HOSPITAL ENCOUNTER (OUTPATIENT)
Age: 21
Discharge: HOME | End: 2021-07-27
Payer: COMMERCIAL

## 2021-07-27 DIAGNOSIS — Z3A.28 28 WEEKS GESTATION OF PREGNANCY: ICD-10-CM

## 2021-07-27 LAB
GLUCOSE ADMINISTRATION: NORMAL
GLUCOSE TOLERANCE SCREEN 50G: 86 MG/DL (ref 70–135)
HEMOGLOBIN: 11.4 G/DL (ref 11.9–15.1)

## 2021-07-27 PROCEDURE — 82950 GLUCOSE TEST: CPT

## 2021-07-27 PROCEDURE — 36415 COLL VENOUS BLD VENIPUNCTURE: CPT

## 2021-07-27 PROCEDURE — 85018 HEMOGLOBIN: CPT

## 2021-08-03 ENCOUNTER — ROUTINE PRENATAL (OUTPATIENT)
Dept: OBGYN | Age: 21
End: 2021-08-03
Payer: COMMERCIAL

## 2021-08-03 VITALS — DIASTOLIC BLOOD PRESSURE: 64 MMHG | BODY MASS INDEX: 25.53 KG/M2 | WEIGHT: 163 LBS | SYSTOLIC BLOOD PRESSURE: 106 MMHG

## 2021-08-03 DIAGNOSIS — Z3A.29 29 WEEKS GESTATION OF PREGNANCY: Primary | ICD-10-CM

## 2021-08-03 PROCEDURE — 0502F SUBSEQUENT PRENATAL CARE: CPT | Performed by: OBSTETRICS & GYNECOLOGY

## 2021-08-03 NOTE — PROGRESS NOTES
Michael Arenas is here at 29w0d for:    Chief Complaint   Patient presents with    Routine Prenatal Visit     Patient is being seen for routine care. She She would like to discuss GTT and is unsure about Tdap vaccine. Scheduled for 30 week US in 2 weeks. Estimated Due Date: Estimated Date of Delivery: 10/19/21    OB History    Para Term  AB Living   1             SAB TAB Ectopic Molar Multiple Live Births                    # Outcome Date GA Lbr Kyle/2nd Weight Sex Delivery Anes PTL Lv   1 Current                 Past Medical History:   Diagnosis Date    Asthma     Asthma     Complication of anesthesia     Nausea    Mental disorder     anxiety    Mental disorder        Past Surgical History:   Procedure Laterality Date    ABDOMINAL EXPLORATION SURGERY  09/15/2019    RT Oophorectomy    LAPAROSCOPY Left 9/15/2019    EXPLORATORY LAPAROTOMY, RIGHT OOPHRECTOMY performed by Kvng Doyle MD at [place] [address] History     Tobacco Use   Smoking Status Never Smoker   Smokeless Tobacco Never Used        Social History     Substance and Sexual Activity   Alcohol Use Not Currently       No results found for this visit on 21. Vitals:  /64   Wt 163 lb (73.9 kg)   LMP 2021 (Approximate)   BMI 25.53 kg/m²   Estimated body mass index is 25.53 kg/m² as calculated from the following:    Height as of 7/3/21: 5' 7\" (1.702 m). Weight as of this encounter: 163 lb (73.9 kg). HPI: here for routine pnv - no bleeding; reviewed 1 hour and hgb    Yes PT denies fever, chills, nausea and vomiting       Abdomen: enlarged, gravid     Results reviewed today:    No results found for this visit on 21. See prenatal vital sign section and fetal assessment section    ASSESSMENT & Plan    Diagnosis Orders   1. 29 weeks gestation of pregnancy               I am having Maritza Knapp maintain her acetaminophen, Ventolin HFA, Prenatal Vit-Fe Fumarate-FA (PRENATAL VITAMIN PO), loratadine, and Flovent HFA. Return in about 2 weeks (around 8/17/2021) for ob, usn. There are no Patient Instructions on file for this visit.           Gabriel Peraza VD,8/2/6304 9:50 AM

## 2021-08-09 ENCOUNTER — ROUTINE PRENATAL (OUTPATIENT)
Dept: OBGYN | Age: 21
End: 2021-08-09
Payer: COMMERCIAL

## 2021-08-09 VITALS — DIASTOLIC BLOOD PRESSURE: 66 MMHG | WEIGHT: 164 LBS | BODY MASS INDEX: 25.69 KG/M2 | SYSTOLIC BLOOD PRESSURE: 102 MMHG

## 2021-08-09 DIAGNOSIS — O41.8X30 SUBCHORIONIC HEMORRHAGE OF PLACENTA IN THIRD TRIMESTER, SINGLE OR UNSPECIFIED FETUS: ICD-10-CM

## 2021-08-09 DIAGNOSIS — O46.8X3 SUBCHORIONIC HEMORRHAGE OF PLACENTA IN THIRD TRIMESTER, SINGLE OR UNSPECIFIED FETUS: ICD-10-CM

## 2021-08-09 DIAGNOSIS — Z3A.29 29 WEEKS GESTATION OF PREGNANCY: Primary | ICD-10-CM

## 2021-08-09 DIAGNOSIS — O44.03 PLACENTA PREVIA ANTEPARTUM IN THIRD TRIMESTER: ICD-10-CM

## 2021-08-09 PROCEDURE — 0502F SUBSEQUENT PRENATAL CARE: CPT | Performed by: OBSTETRICS & GYNECOLOGY

## 2021-08-09 NOTE — PROGRESS NOTES
Lizz Fraser is here at 29w6d for:    Chief Complaint   Patient presents with    Routine Prenatal Visit     Patient is being seen after US was performed in house. Patient has had some dark brown/red spotting today but states that it has stopped. Estimated Due Date: Estimated Date of Delivery: 10/19/21    OB History    Para Term  AB Living   1             SAB TAB Ectopic Molar Multiple Live Births                    # Outcome Date GA Lbr Kyle/2nd Weight Sex Delivery Anes PTL Lv   1 Current                 Past Medical History:   Diagnosis Date    Asthma     Asthma     Complication of anesthesia     Nausea    Mental disorder     anxiety    Mental disorder        Past Surgical History:   Procedure Laterality Date    ABDOMINAL EXPLORATION SURGERY  09/15/2019    RT Oophorectomy    LAPAROSCOPY Left 9/15/2019    EXPLORATORY LAPAROTOMY, RIGHT OOPHRECTOMY performed by Nidia Younger MD at [address] [place] [address] History     Tobacco Use   Smoking Status Never Smoker   Smokeless Tobacco Never Used        Social History     Substance and Sexual Activity   Alcohol Use Not Currently       No results found for this visit on 21. Vitals:  /66   Wt 164 lb (74.4 kg)   LMP 2021 (Approximate)   BMI 25.69 kg/m²   Estimated body mass index is 25.69 kg/m² as calculated from the following:    Height as of 7/3/21: 5' 7\" (1.702 m). Weight as of this encounter: 164 lb (74.4 kg). HPI: here for routine obv and usn to check previa - scb stable in size    Yes PT denies fever, chills, nausea and vomiting       Abdomen: enlarged, gravid     Results reviewed today:    No results found for this visit on 21. See prenatal vital sign section and fetal assessment section    ASSESSMENT & Plan    Diagnosis Orders   1. 29 weeks gestation of pregnancy     2. Placenta previa antepartum in third trimester     3.  Subchorionic hemorrhage of placenta in third trimester, single or unspecified fetus               I am having Maritza Moscoso maintain her acetaminophen, Ventolin HFA, Prenatal Vit-Fe Fumarate-FA (PRENATAL VITAMIN PO), loratadine, and Flovent HFA. Return in about 2 weeks (around 8/23/2021) for ob. There are no Patient Instructions on file for this visit.           Leonard Jorgensen DO,8/9/2021 6:57 PM

## 2021-08-23 ENCOUNTER — ROUTINE PRENATAL (OUTPATIENT)
Dept: OBGYN | Age: 21
End: 2021-08-23
Payer: COMMERCIAL

## 2021-08-23 VITALS — WEIGHT: 167 LBS | BODY MASS INDEX: 26.16 KG/M2 | DIASTOLIC BLOOD PRESSURE: 64 MMHG | SYSTOLIC BLOOD PRESSURE: 106 MMHG

## 2021-08-23 DIAGNOSIS — Z3A.31 31 WEEKS GESTATION OF PREGNANCY: Primary | ICD-10-CM

## 2021-08-23 PROCEDURE — 0502F SUBSEQUENT PRENATAL CARE: CPT | Performed by: OBSTETRICS & GYNECOLOGY

## 2021-08-23 NOTE — PROGRESS NOTES
Goran Johnson is here at [address] for:    Chief Complaint   Patient presents with    Routine Prenatal Visit     Patient states that she has been feeling well. Denies bleeding but notes occasional cramping off and on. Estimated Due Date: Estimated Date of Delivery: 10/19/21    OB History    Para Term  AB Living   1             SAB TAB Ectopic Molar Multiple Live Births                    # Outcome Date GA Lbr Kyle/2nd Weight Sex Delivery Anes PTL Lv   1 Current                 Past Medical History:   Diagnosis Date    Asthma     Asthma     Complication of anesthesia     Nausea    Mental disorder     anxiety    Mental disorder        Past Surgical History:   Procedure Laterality Date    ABDOMINAL EXPLORATION SURGERY  09/15/2019    RT Oophorectomy    LAPAROSCOPY Left 9/15/2019    EXPLORATORY LAPAROTOMY, RIGHT OOPHRECTOMY performed by Tami Lozano MD at [address] [place] [address] History     Tobacco Use   Smoking Status Never Smoker   Smokeless Tobacco Never Used        Social History     Substance and Sexual Activity   Alcohol Use Not Currently       No results found for this visit on 21. Vitals:  /64   Wt 167 lb (75.8 kg)   LMP 2021 (Approximate)   BMI 26.16 kg/m²   Estimated body mass index is 26.16 kg/m² as calculated from the following:    Height as of 7/3/21: 5' 7\" (1.702 m). Weight as of this encounter: 167 lb (75.8 kg). HPI: here for routine obv - occ cramping    Yes PT denies fever, chills, nausea and vomiting       Abdomen: enlarged, gravid     Results reviewed today:    No results found for this visit on 21. See prenatal vital sign section and fetal assessment section    ASSESSMENT & Plan    Diagnosis Orders   1. 31 weeks gestation of pregnancy               I am having Maritza HEATHRodger Blanka maintain her acetaminophen, Ventolin HFA, Prenatal Vit-Fe Fumarate-FA (PRENATAL VITAMIN PO), loratadine, and Flovent HFA.     Return in about 2 weeks (around 9/6/2021) for ob. There are no Patient Instructions on file for this visit.           Mace Median, PB,6/92/8017 5:44 PM

## 2021-08-30 ENCOUNTER — HOSPITAL ENCOUNTER (OUTPATIENT)
Age: 21
Discharge: HOME | End: 2021-08-30
Attending: ADVANCED PRACTICE MIDWIFE | Admitting: ADVANCED PRACTICE MIDWIFE
Payer: COMMERCIAL

## 2021-08-30 ENCOUNTER — APPOINTMENT (OUTPATIENT)
Dept: ULTRASOUND IMAGING | Age: 21
End: 2021-08-30
Payer: COMMERCIAL

## 2021-08-30 VITALS
SYSTOLIC BLOOD PRESSURE: 95 MMHG | DIASTOLIC BLOOD PRESSURE: 50 MMHG | HEART RATE: 91 BPM | RESPIRATION RATE: 18 BRPM | TEMPERATURE: 98.4 F

## 2021-08-30 LAB
-: ABNORMAL
AMORPHOUS: ABNORMAL
BACTERIA: ABNORMAL
BILIRUBIN URINE: NEGATIVE
CASTS UA: ABNORMAL /LPF
COLOR: YELLOW
COMMENT UA: ABNORMAL
CRYSTALS, UA: ABNORMAL /HPF
EPITHELIAL CELLS UA: ABNORMAL /HPF (ref 0–25)
GLUCOSE URINE: NEGATIVE
KETONES, URINE: NEGATIVE
LEUKOCYTE ESTERASE, URINE: NEGATIVE
MUCUS: ABNORMAL
NITRITE, URINE: POSITIVE
OTHER OBSERVATIONS UA: ABNORMAL
PH UA: 7.5 (ref 5–9)
PROTEIN UA: NEGATIVE
RBC UA: ABNORMAL /HPF (ref 0–2)
RENAL EPITHELIAL, UA: ABNORMAL /HPF
SPECIFIC GRAVITY UA: 1.01 (ref 1.01–1.02)
TRICHOMONAS: ABNORMAL
TURBIDITY: CLEAR
URINE HGB: ABNORMAL
UROBILINOGEN, URINE: NORMAL
WBC UA: ABNORMAL /HPF (ref 0–5)
YEAST: ABNORMAL

## 2021-08-30 PROCEDURE — 96365 THER/PROPH/DIAG IV INF INIT: CPT

## 2021-08-30 PROCEDURE — 96360 HYDRATION IV INFUSION INIT: CPT

## 2021-08-30 PROCEDURE — 6360000002 HC RX W HCPCS: Performed by: ADVANCED PRACTICE MIDWIFE

## 2021-08-30 PROCEDURE — 86403 PARTICLE AGGLUT ANTBDY SCRN: CPT

## 2021-08-30 PROCEDURE — 6370000000 HC RX 637 (ALT 250 FOR IP): Performed by: ADVANCED PRACTICE MIDWIFE

## 2021-08-30 PROCEDURE — G0463 HOSPITAL OUTPT CLINIC VISIT: HCPCS

## 2021-08-30 PROCEDURE — 87186 SC STD MICRODIL/AGAR DIL: CPT

## 2021-08-30 PROCEDURE — 59025 FETAL NON-STRESS TEST: CPT

## 2021-08-30 PROCEDURE — 87086 URINE CULTURE/COLONY COUNT: CPT

## 2021-08-30 PROCEDURE — 81001 URINALYSIS AUTO W/SCOPE: CPT

## 2021-08-30 PROCEDURE — 76805 OB US >/= 14 WKS SNGL FETUS: CPT

## 2021-08-30 PROCEDURE — 96361 HYDRATE IV INFUSION ADD-ON: CPT

## 2021-08-30 PROCEDURE — 87077 CULTURE AEROBIC IDENTIFY: CPT

## 2021-08-30 PROCEDURE — 99215 OFFICE O/P EST HI 40 MIN: CPT

## 2021-08-30 PROCEDURE — 2580000003 HC RX 258: Performed by: ADVANCED PRACTICE MIDWIFE

## 2021-08-30 RX ORDER — SODIUM CHLORIDE, SODIUM LACTATE, POTASSIUM CHLORIDE, CALCIUM CHLORIDE 600; 310; 30; 20 MG/100ML; MG/100ML; MG/100ML; MG/100ML
1000 INJECTION, SOLUTION INTRAVENOUS ONCE
Status: COMPLETED | OUTPATIENT
Start: 2021-08-30 | End: 2021-08-30

## 2021-08-30 RX ORDER — NIFEDIPINE 10 MG/1
40 CAPSULE ORAL ONCE
Status: COMPLETED | OUTPATIENT
Start: 2021-08-30 | End: 2021-08-30

## 2021-08-30 RX ORDER — CEPHALEXIN 500 MG/1
500 CAPSULE ORAL ONCE
Status: DISCONTINUED | OUTPATIENT
Start: 2021-08-30 | End: 2021-08-30

## 2021-08-30 RX ADMIN — DEXTROSE MONOHYDRATE 1000 MG: 50 INJECTION, SOLUTION INTRAVENOUS at 15:43

## 2021-08-30 RX ADMIN — NIFEDIPINE 40 MG: 10 CAPSULE ORAL at 15:42

## 2021-08-30 RX ADMIN — SODIUM CHLORIDE, POTASSIUM CHLORIDE, SODIUM LACTATE AND CALCIUM CHLORIDE 1000 ML: 600; 310; 30; 20 INJECTION, SOLUTION INTRAVENOUS at 15:28

## 2021-08-30 NOTE — PROGRESS NOTES
Patient presents to L&D today for vaginal bleeding last night, has known placenta previa. IUP at 32 6/7 weeks     NST is reactive. Quality of tracing is satisfactory.

## 2021-08-30 NOTE — PROGRESS NOTES
Trever VELEZ updated on ultrasound results. Danie Mtz states Dr. Ta Sprague is not on call today so she is going to consult with Dr. Teagan Jewell regarding patient and will call RN back. RN reports that IV bolus is going and antibiotic is infusing.

## 2021-08-30 NOTE — PROGRESS NOTES
Trever CNM calls RN back and instructs RN to call her 30 min after IV fluid bolus complete. RN reports pt asking to eat and CNM states patient can eat.

## 2021-08-30 NOTE — PROGRESS NOTES
Obstetrical outpatient discharge instructions explained to pt, pt nicky Pt instructed on how to do fetal kick count, pt nicky Pt instructed to call her OB office in the a.m. for a follow up appointment, keara saunders

## 2021-08-30 NOTE — PROGRESS NOTES
Pt arrives per wheelchair accompanied by FOB. Pt c/o cramping, reports + fetal movement. Pt states she has been cramping since last night, states it worsened around 7-9am this morning and she woke up at that time. Pt states she had vaginal bleeding at 21:30 last night - shows Rn a picture of underwear saturated with red blood in crotch. Pt states she was told that she still has a clot inside uterus - at [facility]. V's - states she has a placenta previa. Pt sees Dr. Dilip Harper for her prenatal care. Pt states she went to [place] on Saturday, walked a lot and started to cramp and needed a wheelchair then cramping went away. Pt on pelvic rest and has not had sex since June 25th. Pt states right now there is no blood in her underwear and she did not wear a pad in because she stopped bleeding.

## 2021-08-30 NOTE — PLAN OF CARE
Problem: Sensory:  Goal: Relief or control of pain from uterine contractions will improve  Description: Relief or control of pain from uterine contractions will improve  8/30/2021 1940 by Angie Shell RN  Outcome: Ongoing  8/30/2021 1633 by Angie Shell RN  Outcome: Ongoing

## 2021-08-30 NOTE — PROGRESS NOTES
Prescription for Keflex 500mg PO BID x 7 days called in to [facility] in [place], [address] [place] [address] Dr chet Tubbs CNM's order.

## 2021-08-30 NOTE — PROGRESS NOTES
Trever [facility] calls RN back. Rn reports urinalysis results and pt's c/o cramping now and bleeding last night. RN reports NST reactive, baby looks good. Rn reports pt has had irritability and a few small contractions in the last hour. Keflex 500mg PO x 1 dose ordered now per CNM. CNM states she is going to call Dr. Miles Moscoso and consult for further orders and will call RN back.

## 2021-08-30 NOTE — PROGRESS NOTES
RN calls DORIS Tubbs to update. Pt c/o pain in left side towards abdomen, states it comes with cramping and pt stated when she lays for long periods, she gets this pain on both sides. Warm rice sock applied to left side. RN reports that pt has a family member picking up antibiotic from pharmacy. Orders received to discharge patient to home with instructions to return if bleeding returns or cramping increases, pt to continue bedrest. Pt to call office in morning for follow up appointment per CNM.

## 2021-09-01 ENCOUNTER — ROUTINE PRENATAL (OUTPATIENT)
Dept: OBGYN | Age: 21
End: 2021-09-01
Payer: COMMERCIAL

## 2021-09-01 VITALS — WEIGHT: 165.8 LBS | BODY MASS INDEX: 25.97 KG/M2 | SYSTOLIC BLOOD PRESSURE: 112 MMHG | DIASTOLIC BLOOD PRESSURE: 74 MMHG

## 2021-09-01 DIAGNOSIS — Z34.03 ENCOUNTER FOR SUPERVISION OF NORMAL FIRST PREGNANCY IN THIRD TRIMESTER: ICD-10-CM

## 2021-09-01 DIAGNOSIS — Z3A.33 33 WEEKS GESTATION OF PREGNANCY: ICD-10-CM

## 2021-09-01 DIAGNOSIS — G44.89 OTHER HEADACHE SYNDROME: Primary | ICD-10-CM

## 2021-09-01 LAB
CULTURE: ABNORMAL
CULTURE: ABNORMAL
Lab: ABNORMAL
SPECIMEN DESCRIPTION: ABNORMAL

## 2021-09-01 PROCEDURE — 0502F SUBSEQUENT PRENATAL CARE: CPT | Performed by: ADVANCED PRACTICE MIDWIFE

## 2021-09-01 RX ORDER — BUTALBITAL, ACETAMINOPHEN AND CAFFEINE 50; 325; 40 MG/1; MG/1; MG/1
TABLET ORAL
Qty: 30 TABLET | Refills: 1 | Status: SHIPPED | OUTPATIENT
Start: 2021-09-01 | End: 2022-08-22

## 2021-09-01 RX ORDER — CEPHALEXIN 500 MG/1
CAPSULE ORAL
Status: ON HOLD | COMMUNITY
Start: 2021-08-30 | End: 2021-09-10 | Stop reason: ALTCHOICE

## 2021-09-02 NOTE — PROGRESS NOTES
Rodrick Peraltaalexisami is here at 33w1d for:    Chief Complaint   Patient presents with    Routine Prenatal Visit     Follow up , patient was in L&D on 2021 due to vaginal bleeding Bleeding has now turned brown C/O migraine headaches. .Patient taking Silvano Karvonen for UTI. .        Estimated Due Date: Estimated Date of Delivery: 10/19/21    OB History    Para Term  AB Living   1             SAB TAB Ectopic Molar Multiple Live Births                    # Outcome Date GA Lbr Kyle/2nd Weight Sex Delivery Anes PTL Lv   1 Current                 Past Medical History:   Diagnosis Date    Asthma     Asthma     Complication of anesthesia     Nausea    Mental disorder     anxiety    Mental disorder        Past Surgical History:   Procedure Laterality Date    ABDOMINAL EXPLORATION SURGERY  09/15/2019    RT Oophorectomy    LAPAROSCOPY Left 9/15/2019    EXPLORATORY LAPAROTOMY, RIGHT OOPHRECTOMY performed by Serina Davis MD at [place] [address] History     Tobacco Use   Smoking Status Never Smoker   Smokeless Tobacco Never Used        Social History     Substance and Sexual Activity   Alcohol Use Not Currently       No results found for this visit on 21. HPI: here for routine ob visit, was seen in L&D for bleeding, currently has minimal brown discharge     PT denies fever, chills, nausea and vomiting       Vitals:  Estimated body mass index is 25.97 kg/m² as calculated from the following:    Height as of 7/3/21: 5' 7\" (1.702 m). Weight as of this encounter: 165 lb 12.8 oz (75.2 kg). BP: 112/74  Weight: 165 lb 12.8 oz (75.2 kg)  Patient Position: Sitting  Albumin: Negative  Glucose: Negative  Movement: Present    Abdomen: enlarged, gravid,soft, nontender         Results reviewed today:    No results found for this visit on 21. See prenatal vital sign section and fetal assessment section    ASSESSMENT & Plan    Diagnosis Orders   1.  Other headache syndrome butalbital-acetaminophen-caffeine (FIORICET, ESGIC) -40 MG per tablet   2. Encounter for supervision of normal first pregnancy in third trimester     3. 33 weeks gestation of pregnancy       Reviewed danger signs of further bleeding or headaches        I am having Maritza Moscoso start on butalbital-acetaminophen-caffeine. I am also having her maintain her acetaminophen, Ventolin HFA, Prenatal Vit-Fe Fumarate-FA (PRENATAL VITAMIN PO), loratadine, Flovent HFA, and cephALEXin. Return keep appt 9/7. There are no Patient Instructions on file for this visit.           DEMETRIO Justice CNM,9/1/2021 10:02 PM

## 2021-09-05 ENCOUNTER — TELEPHONE (OUTPATIENT)
Dept: OBGYN CLINIC | Age: 21
End: 2021-09-05

## 2021-09-05 ENCOUNTER — HOSPITAL ENCOUNTER (OUTPATIENT)
Age: 21
Discharge: HOME | End: 2021-09-06
Attending: ADVANCED PRACTICE MIDWIFE | Admitting: ADVANCED PRACTICE MIDWIFE
Payer: COMMERCIAL

## 2021-09-05 PROBLEM — N93.9 VAGINAL BLEEDING: Status: ACTIVE | Noted: 2021-09-05

## 2021-09-05 PROCEDURE — 99214 OFFICE O/P EST MOD 30 MIN: CPT

## 2021-09-05 PROCEDURE — 59025 FETAL NON-STRESS TEST: CPT

## 2021-09-06 VITALS
HEIGHT: 67 IN | TEMPERATURE: 98.7 F | HEART RATE: 82 BPM | DIASTOLIC BLOOD PRESSURE: 69 MMHG | WEIGHT: 165 LBS | BODY MASS INDEX: 25.9 KG/M2 | SYSTOLIC BLOOD PRESSURE: 114 MMHG

## 2021-09-06 NOTE — TELEPHONE ENCOUNTER
Ronaer reports that she is 34 weeks and has passed a clot as expected. However she feels the bleeding afterwards has lingered longer than with other clots she has passed. Pre doctor's instructions patient will go to [address] [place] [address] and Delivery for an assessment. Patient states she will go.

## 2021-09-06 NOTE — FLOWSHEET NOTE
Patient verbalizes understanding of discharge instructions, with emphasis on returning to hospital if bleeding recurs. Patient agrees to activity restrictions as she has previously been instructed by Dr Jefry Koenig and Carlos Ansari.

## 2021-09-06 NOTE — FLOWSHEET NOTE
Patient arrived from home with complaint of moderate amount of vaginal bleeding at 7 pm that has diminished in amount since that time. Patient states she is on modified bedrest with minimal activity due to marginal placenta previa. Patient states she has has bleeding off and on throughout pregnancy and Dr Serg Ruiz has had patient staying off her feet as much as possible since about June. Patient denies contractions or rupture of membranes. Obtaining clean void urine and then will place on EFM.

## 2021-09-06 NOTE — FLOWSHEET NOTE
Patient with scant amount pink vaginal discharge only when she wiped after voiding. Phoned update to [address] [place] [address]. Discharge order received.

## 2021-09-06 NOTE — FLOWSHEET NOTE
Phoned [facility] with report of patient bleeding episode at 7 pm this evening, continues on Keflex, good fetal movement, no complaint of contractions or rupture of membranes. See orders.

## 2021-09-06 NOTE — FLOWSHEET NOTE
Scant amount old blood on kirit pad at this time. Patient states baby is active. Patient states she is still taking antibiotic as prescribed for UTI.

## 2021-09-07 ENCOUNTER — ROUTINE PRENATAL (OUTPATIENT)
Dept: OBGYN | Age: 21
End: 2021-09-07
Payer: COMMERCIAL

## 2021-09-07 VITALS — BODY MASS INDEX: 25.84 KG/M2 | SYSTOLIC BLOOD PRESSURE: 106 MMHG | DIASTOLIC BLOOD PRESSURE: 66 MMHG | WEIGHT: 165 LBS

## 2021-09-07 DIAGNOSIS — Z3A.34 34 WEEKS GESTATION OF PREGNANCY: Primary | ICD-10-CM

## 2021-09-07 DIAGNOSIS — O44.03 PLACENTA PREVIA ANTEPARTUM IN THIRD TRIMESTER: ICD-10-CM

## 2021-09-07 PROCEDURE — 0502F SUBSEQUENT PRENATAL CARE: CPT | Performed by: OBSTETRICS & GYNECOLOGY

## 2021-09-07 NOTE — PROGRESS NOTES
Terrance Newberry is here at 34w0d for:    Chief Complaint   Patient presents with    Routine Prenatal Visit     Patient is being seen for routine care. She notes she was seen at L&D on  due to passing a large clot. She notes that she has been feeling very crampy and a lot of low pressure. She has continued to have vaginal bleeding some pink and brown. Estimated Due Date: Estimated Date of Delivery: 10/19/21    OB History    Para Term  AB Living   1             SAB TAB Ectopic Molar Multiple Live Births                    # Outcome Date GA Lbr Kyle/2nd Weight Sex Delivery Anes PTL Lv   1 Current                 Past Medical History:   Diagnosis Date    Asthma     Asthma     Complication of anesthesia     Nausea    Mental disorder     anxiety    Mental disorder        Past Surgical History:   Procedure Laterality Date    ABDOMINAL EXPLORATION SURGERY  09/15/2019    RT Oophorectomy    LAPAROSCOPY Left 9/15/2019    EXPLORATORY LAPAROTOMY, RIGHT OOPHRECTOMY performed by Beverly Koroma MD at [address] History     Tobacco Use   Smoking Status Never Smoker   Smokeless Tobacco Never Used        Social History     Substance and Sexual Activity   Alcohol Use Not Currently       No results found for this visit on 21. Vitals:  /66   Wt 165 lb (74.8 kg)   LMP 2021 (Approximate)   BMI 25.84 kg/m²   Estimated body mass index is 25.84 kg/m² as calculated from the following:    Height as of 21: 5' 7\" (1.702 m). Weight as of this encounter: 165 lb (74.8 kg). HPI: here for routine pnv - has had intermittent un-exacerbated bleeding - in l&d twice    Yes PT denies fever, chills, nausea and vomiting       Abdomen: enlarged, gravid     Results reviewed today:    No results found for this visit on 21.     See prenatal vital sign section and fetal assessment section    ASSESSMENT & Plan    Diagnosis Orders   1. 34 weeks gestation of pregnancy     2. Placenta previa antepartum in third trimester               I am having Maritza Moscoso maintain her acetaminophen, Ventolin HFA, Prenatal Vit-Fe Fumarate-FA (PRENATAL VITAMIN PO), loratadine, Flovent HFA, cephALEXin, and butalbital-acetaminophen-caffeine. Return in about 1 week (around 9/14/2021) for ob, usn. There are no Patient Instructions on file for this visit.           Matthieu Singh DO,9/7/2021 8:12 AM

## 2021-09-10 ENCOUNTER — HOSPITAL ENCOUNTER (OUTPATIENT)
Age: 21
Discharge: HOME | End: 2021-09-11
Attending: ADVANCED PRACTICE MIDWIFE | Admitting: ADVANCED PRACTICE MIDWIFE
Payer: COMMERCIAL

## 2021-09-10 VITALS
HEIGHT: 67 IN | BODY MASS INDEX: 25.9 KG/M2 | HEART RATE: 75 BPM | DIASTOLIC BLOOD PRESSURE: 68 MMHG | WEIGHT: 165 LBS | TEMPERATURE: 98 F | RESPIRATION RATE: 18 BRPM | SYSTOLIC BLOOD PRESSURE: 118 MMHG

## 2021-09-10 LAB
-: ABNORMAL
AMORPHOUS: ABNORMAL
BACTERIA: ABNORMAL
BILIRUBIN URINE: NEGATIVE
CASTS UA: ABNORMAL /LPF
COLOR: YELLOW
COMMENT UA: ABNORMAL
CRYSTALS, UA: ABNORMAL /HPF
EPITHELIAL CELLS UA: ABNORMAL /HPF (ref 0–25)
GLUCOSE URINE: NEGATIVE
KETONES, URINE: NEGATIVE
LEUKOCYTE ESTERASE, URINE: NEGATIVE
MUCUS: ABNORMAL
NITRITE, URINE: NEGATIVE
OTHER OBSERVATIONS UA: ABNORMAL
PH UA: 7 (ref 5–9)
PROTEIN UA: NEGATIVE
RBC UA: ABNORMAL /HPF (ref 0–2)
RENAL EPITHELIAL, UA: ABNORMAL /HPF
SPECIFIC GRAVITY UA: 1.01 (ref 1.01–1.02)
TRICHOMONAS: ABNORMAL
TURBIDITY: ABNORMAL
URINE HGB: ABNORMAL
UROBILINOGEN, URINE: NORMAL
WBC UA: ABNORMAL /HPF (ref 0–5)
YEAST: ABNORMAL

## 2021-09-10 PROCEDURE — 6360000002 HC RX W HCPCS: Performed by: ADVANCED PRACTICE MIDWIFE

## 2021-09-10 PROCEDURE — 81001 URINALYSIS AUTO W/SCOPE: CPT

## 2021-09-10 RX ORDER — BETAMETHASONE SODIUM PHOSPHATE AND BETAMETHASONE ACETATE 3; 3 MG/ML; MG/ML
12 INJECTION, SUSPENSION INTRA-ARTICULAR; INTRALESIONAL; INTRAMUSCULAR; SOFT TISSUE EVERY 24 HOURS
Status: DISCONTINUED | OUTPATIENT
Start: 2021-09-10 | End: 2021-09-11 | Stop reason: HOSPADM

## 2021-09-10 RX ADMIN — BETAMETHASONE SODIUM PHOSPHATE AND BETAMETHASONE ACETATE 12 MG: 3; 3 INJECTION, SUSPENSION INTRA-ARTICULAR; INTRALESIONAL; INTRAMUSCULAR at 21:32

## 2021-09-11 ENCOUNTER — HOSPITAL ENCOUNTER (OUTPATIENT)
Dept: LABOR AND DELIVERY | Age: 21
Discharge: HOME | End: 2021-09-11
Attending: OBSTETRICS & GYNECOLOGY | Admitting: OBSTETRICS & GYNECOLOGY
Payer: COMMERCIAL

## 2021-09-11 VITALS
HEART RATE: 84 BPM | RESPIRATION RATE: 18 BRPM | TEMPERATURE: 97.9 F | DIASTOLIC BLOOD PRESSURE: 76 MMHG | SYSTOLIC BLOOD PRESSURE: 110 MMHG

## 2021-09-11 LAB
ABSOLUTE EOS #: <0.03 K/UL (ref 0–0.44)
ABSOLUTE IMMATURE GRANULOCYTE: 0.06 K/UL (ref 0–0.3)
ABSOLUTE LYMPH #: 0.81 K/UL (ref 1.1–3.7)
ABSOLUTE MONO #: 0.09 K/UL (ref 0.1–1.2)
ALBUMIN SERPL-MCNC: 3.9 G/DL (ref 3.5–5.2)
ALBUMIN/GLOBULIN RATIO: 1.6 (ref 1–2.5)
ALP BLD-CCNC: 116 U/L (ref 35–104)
ALT SERPL-CCNC: 6 U/L (ref 5–33)
ANION GAP SERPL CALCULATED.3IONS-SCNC: 10 MMOL/L (ref 9–17)
AST SERPL-CCNC: 11 U/L
BASOPHILS # BLD: 0 % (ref 0–2)
BASOPHILS ABSOLUTE: <0.03 K/UL (ref 0–0.2)
BILIRUB SERPL-MCNC: 0.61 MG/DL (ref 0.3–1.2)
BUN BLDV-MCNC: 6 MG/DL (ref 6–20)
BUN/CREAT BLD: 17 (ref 9–20)
CALCIUM SERPL-MCNC: 9 MG/DL (ref 8.6–10.4)
CHLORIDE BLD-SCNC: 104 MMOL/L (ref 98–107)
CO2: 17 MMOL/L (ref 20–31)
CREAT SERPL-MCNC: 0.35 MG/DL (ref 0.5–0.9)
DIFFERENTIAL TYPE: ABNORMAL
EOSINOPHILS RELATIVE PERCENT: 0 % (ref 1–4)
GFR AFRICAN AMERICAN: >60 ML/MIN
GFR NON-AFRICAN AMERICAN: >60 ML/MIN
GFR SERPL CREATININE-BSD FRML MDRD: ABNORMAL ML/MIN/{1.73_M2}
GFR SERPL CREATININE-BSD FRML MDRD: ABNORMAL ML/MIN/{1.73_M2}
GLUCOSE BLD-MCNC: 116 MG/DL (ref 70–99)
HCT VFR BLD CALC: 33.5 % (ref 36.3–47.1)
HEMOGLOBIN: 10.9 G/DL (ref 11.9–15.1)
IMMATURE GRANULOCYTES: 1 %
LYMPHOCYTES # BLD: 8 % (ref 24–43)
MCH RBC QN AUTO: 29 PG (ref 25.2–33.5)
MCHC RBC AUTO-ENTMCNC: 32.5 G/DL (ref 28.4–34.8)
MCV RBC AUTO: 89.1 FL (ref 82.6–102.9)
MONOCYTES # BLD: 1 % (ref 3–12)
NRBC AUTOMATED: 0 PER 100 WBC
PDW BLD-RTO: 12.6 % (ref 11.8–14.4)
PLATELET # BLD: 221 K/UL (ref 138–453)
PLATELET ESTIMATE: ABNORMAL
PMV BLD AUTO: 10.1 FL (ref 8.1–13.5)
POTASSIUM SERPL-SCNC: 3.7 MMOL/L (ref 3.7–5.3)
RBC # BLD: 3.76 M/UL (ref 3.95–5.11)
RBC # BLD: ABNORMAL 10*6/UL
SEG NEUTROPHILS: 90 % (ref 36–65)
SEGMENTED NEUTROPHILS ABSOLUTE COUNT: 9.88 K/UL (ref 1.5–8.1)
SODIUM BLD-SCNC: 131 MMOL/L (ref 135–144)
TOTAL PROTEIN: 6.4 G/DL (ref 6.4–8.3)
WBC # BLD: 10.9 K/UL (ref 3.5–11.3)
WBC # BLD: ABNORMAL 10*3/UL

## 2021-09-11 PROCEDURE — 36415 COLL VENOUS BLD VENIPUNCTURE: CPT

## 2021-09-11 PROCEDURE — 2580000003 HC RX 258: Performed by: ADVANCED PRACTICE MIDWIFE

## 2021-09-11 PROCEDURE — 6370000000 HC RX 637 (ALT 250 FOR IP): Performed by: ADVANCED PRACTICE MIDWIFE

## 2021-09-11 PROCEDURE — 96361 HYDRATE IV INFUSION ADD-ON: CPT

## 2021-09-11 PROCEDURE — 96360 HYDRATION IV INFUSION INIT: CPT

## 2021-09-11 PROCEDURE — 96372 THER/PROPH/DIAG INJ SC/IM: CPT

## 2021-09-11 PROCEDURE — 6360000002 HC RX W HCPCS: Performed by: OBSTETRICS & GYNECOLOGY

## 2021-09-11 PROCEDURE — 85025 COMPLETE CBC W/AUTO DIFF WBC: CPT

## 2021-09-11 PROCEDURE — 80053 COMPREHEN METABOLIC PANEL: CPT

## 2021-09-11 PROCEDURE — 99211 OFF/OP EST MAY X REQ PHY/QHP: CPT

## 2021-09-11 RX ORDER — BETAMETHASONE SODIUM PHOSPHATE AND BETAMETHASONE ACETATE 3; 3 MG/ML; MG/ML
12 INJECTION, SUSPENSION INTRA-ARTICULAR; INTRALESIONAL; INTRAMUSCULAR; SOFT TISSUE ONCE
Status: COMPLETED | OUTPATIENT
Start: 2021-09-11 | End: 2021-09-11

## 2021-09-11 RX ORDER — SODIUM CHLORIDE, SODIUM LACTATE, POTASSIUM CHLORIDE, CALCIUM CHLORIDE 600; 310; 30; 20 MG/100ML; MG/100ML; MG/100ML; MG/100ML
INJECTION, SOLUTION INTRAVENOUS CONTINUOUS
Status: DISCONTINUED | OUTPATIENT
Start: 2021-09-11 | End: 2021-09-11 | Stop reason: HOSPADM

## 2021-09-11 RX ORDER — HYDROXYZINE PAMOATE 50 MG/1
50 CAPSULE ORAL ONCE
Status: COMPLETED | OUTPATIENT
Start: 2021-09-11 | End: 2021-09-11

## 2021-09-11 RX ORDER — BETAMETHASONE SODIUM PHOSPHATE AND BETAMETHASONE ACETATE 3; 3 MG/ML; MG/ML
12 INJECTION, SUSPENSION INTRA-ARTICULAR; INTRALESIONAL; INTRAMUSCULAR; SOFT TISSUE ONCE
Status: CANCELLED | OUTPATIENT
Start: 2021-09-11

## 2021-09-11 RX ORDER — SODIUM CHLORIDE, SODIUM LACTATE, POTASSIUM CHLORIDE, CALCIUM CHLORIDE 600; 310; 30; 20 MG/100ML; MG/100ML; MG/100ML; MG/100ML
500 INJECTION, SOLUTION INTRAVENOUS ONCE
Status: COMPLETED | OUTPATIENT
Start: 2021-09-11 | End: 2021-09-11

## 2021-09-11 RX ADMIN — SODIUM CHLORIDE, POTASSIUM CHLORIDE, SODIUM LACTATE AND CALCIUM CHLORIDE 500 ML: 600; 310; 30; 20 INJECTION, SOLUTION INTRAVENOUS at 05:22

## 2021-09-11 RX ADMIN — HYDROXYZINE PAMOATE 50 MG: 50 CAPSULE ORAL at 05:50

## 2021-09-11 RX ADMIN — BETAMETHASONE SODIUM PHOSPHATE AND BETAMETHASONE ACETATE 12 MG: 3; 3 INJECTION, SUSPENSION INTRA-ARTICULAR; INTRALESIONAL; INTRAMUSCULAR at 21:26

## 2021-09-11 NOTE — FLOWSHEET NOTE
Spoke with MAXIMINO Rowland CNM updated her on patient and bleeding.  ROSIE would like to get a second opinion from Dr. Hugo Fraser in AM

## 2021-09-11 NOTE — FLOWSHEET NOTE
Patient called out with more bleeding. RN assess pad with a small streak down the middle of the pad. Patient has not voided since arriving to unit. Patient gets up to void. Will assess after restroom.

## 2021-09-11 NOTE — FLOWSHEET NOTE
Patient presents to [facility] dept for vaginal bleeding. Patient has a marginal placenta. Dee VELEZ on unit and assesses patient.

## 2021-09-11 NOTE — FLOWSHEET NOTE
Pt stated bleeding was very little when she went to the restroom this time. And said she felt ready to go back home.

## 2021-09-11 NOTE — FLOWSHEET NOTE
Dee VELEZ leaves unit. [facility] states to call her at 2300 with patient update.

## 2021-09-11 NOTE — PROGRESS NOTES
DX 34 weeks   Vaginal bleeding  Marginal placenta    NST reactive    Reviewed pt and plan with Dr. Jefry Koenig

## 2021-09-11 NOTE — FLOWSHEET NOTE
Noted narrow strip of bright red blood [id number] on kirit pad when patient up to bathroom at 23:12. Phoned JESSICA Whiting CNM with update. K pool CNM says to continue to monitor patient. Patient is concerned about baby and bleeding. Emotional support given. JESSICA whiting CNM says no need for ultrasound at this time, as patient had stated she wondered if an ultrasound would tell her what is currently happening.

## 2021-09-12 NOTE — PROGRESS NOTES
Pt received second celestone injection. Pt denies any complaints of pain or bleeding. Pt was discharged and wheeled off floor by FOB.

## 2021-09-13 ENCOUNTER — ROUTINE PRENATAL (OUTPATIENT)
Dept: OBGYN | Age: 21
End: 2021-09-13
Payer: COMMERCIAL

## 2021-09-13 VITALS — DIASTOLIC BLOOD PRESSURE: 60 MMHG | SYSTOLIC BLOOD PRESSURE: 104 MMHG | WEIGHT: 168 LBS | BODY MASS INDEX: 26.31 KG/M2

## 2021-09-13 DIAGNOSIS — O44.03 PLACENTA PREVIA IN THIRD TRIMESTER: ICD-10-CM

## 2021-09-13 DIAGNOSIS — Z3A.34 34 WEEKS GESTATION OF PREGNANCY: Primary | ICD-10-CM

## 2021-09-13 PROCEDURE — 0502F SUBSEQUENT PRENATAL CARE: CPT | Performed by: OBSTETRICS & GYNECOLOGY

## 2021-09-13 NOTE — PROGRESS NOTES
09/13/21. See prenatal vital sign section and fetal assessment section    ASSESSMENT & Plan    Diagnosis Orders   1. 34 weeks gestation of pregnancy     2. Placenta previa in third trimester               I am having Maritza Moscoso maintain her acetaminophen, Ventolin HFA, Prenatal Vit-Fe Fumarate-FA (PRENATAL VITAMIN PO), loratadine, Flovent HFA, and butalbital-acetaminophen-caffeine. Return in about 1 week (around 9/20/2021) for ob. There are no Patient Instructions on file for this visit.           Gianfranco Leal DO,9/13/2021 4:03 PM

## 2021-09-16 ENCOUNTER — TELEPHONE (OUTPATIENT)
Dept: OBGYN CLINIC | Age: 21
End: 2021-09-16

## 2021-09-21 ENCOUNTER — PROCEDURE VISIT (OUTPATIENT)
Dept: OBGYN | Age: 21
End: 2021-09-21
Payer: COMMERCIAL

## 2021-09-21 ENCOUNTER — HOSPITAL ENCOUNTER (OUTPATIENT)
Age: 21
Setting detail: SPECIMEN
Discharge: HOME | End: 2021-09-21
Payer: COMMERCIAL

## 2021-09-21 ENCOUNTER — ROUTINE PRENATAL (OUTPATIENT)
Dept: OBGYN | Age: 21
End: 2021-09-21
Payer: COMMERCIAL

## 2021-09-21 VITALS — WEIGHT: 167 LBS | BODY MASS INDEX: 26.16 KG/M2 | SYSTOLIC BLOOD PRESSURE: 110 MMHG | DIASTOLIC BLOOD PRESSURE: 62 MMHG

## 2021-09-21 DIAGNOSIS — Z3A.36 36 WEEKS GESTATION OF PREGNANCY: ICD-10-CM

## 2021-09-21 DIAGNOSIS — O44.03 PLACENTA PREVIA IN THIRD TRIMESTER: ICD-10-CM

## 2021-09-21 DIAGNOSIS — Z3A.36 36 WEEKS GESTATION OF PREGNANCY: Primary | ICD-10-CM

## 2021-09-21 PROCEDURE — 0502F SUBSEQUENT PRENATAL CARE: CPT | Performed by: ADVANCED PRACTICE MIDWIFE

## 2021-09-21 PROCEDURE — 87081 CULTURE SCREEN ONLY: CPT

## 2021-09-21 PROCEDURE — 76815 OB US LIMITED FETUS(S): CPT | Performed by: OBSTETRICS & GYNECOLOGY

## 2021-09-21 PROCEDURE — 86403 PARTICLE AGGLUT ANTBDY SCRN: CPT

## 2021-09-21 NOTE — PROGRESS NOTES
Sue Arango is here at 36w0d for:    Chief Complaint   Patient presents with    Routine Prenatal Visit     F/U in house u/s to check previa. sign c/s consents and do GBS. pt states no issues or concerns         Estimated Due Date: Estimated Date of Delivery: 10/19/21    OB History    Para Term  AB Living   1             SAB TAB Ectopic Molar Multiple Live Births                    # Outcome Date GA Lbr Kyle/2nd Weight Sex Delivery Anes PTL Lv   1 Current                 Past Medical History:   Diagnosis Date    Asthma     rescue inhaler and daily use of flow vent inhaler    Asthma     Complication of anesthesia     Nausea    Mental disorder     anxiety    Mental disorder        Past Surgical History:   Procedure Laterality Date    ABDOMINAL EXPLORATION SURGERY  09/15/2019    RT Oophorectomy    LAPAROSCOPY Left 9/15/2019    EXPLORATORY LAPAROTOMY, RIGHT OOPHRECTOMY performed by Ml Gamez MD at [place] [address] History     Tobacco Use   Smoking Status Never Smoker   Smokeless Tobacco Never Used        Social History     Substance and Sexual Activity   Alcohol Use Not Currently       No results found for this visit on 21. HPI: Reviewed ultrasound today with patient and significant other. Options reviewed. Yes PT denies fever, chills, nausea and vomiting       Vitals:  Estimated body mass index is 26.16 kg/m² as calculated from the following:    Height as of 9/10/21: 5' 7\" (1.702 m). Weight as of this encounter: 167 lb (75.8 kg).   BP: 110/62  Weight: 167 lb (75.8 kg)  Patient Position: Sitting  Albumin: Negative  Glucose: Negative  Movement: Present        Results reviewed today:  Narrative   OB LIMIT- CHECK PLACENTA LOCATION   GA - 36W0D   CL- 3.8CM   HR- 137BPM   LOW LYING PLACENTA, POSTERIOR LEFT CHRIS- 3.2CM FROM INTERNAL CERVICAL OS    ACTIVE FETAL MOVEMENTS    POSITION- CEPHALIC         See prenatal vital sign section and fetal assessment section    ASSESSMENT & Plan    Diagnosis Orders   1. 36 weeks gestation of pregnancy  Culture, Strep B Screen, Vaginal/Rectal   2. Placenta previa in third trimester  Culture, Strep B Screen, Vaginal/Rectal       We reviewed vaginal delivery versus   Reviewed physical health versus mental health  Reviewed patient's choice at this point in time but we will need her to call back with her choice      I am having Maritza Moscoso maintain her acetaminophen, Ventolin HFA, Prenatal Vit-Fe Fumarate-FA (PRENATAL VITAMIN PO), loratadine, Flovent HFA, and butalbital-acetaminophen-caffeine. No follow-ups on file. There are no Patient Instructions on file for this visit.           DEMETRIO Parry CNM,2021 5:43 PM

## 2021-09-23 LAB
CULTURE: ABNORMAL
Lab: ABNORMAL
SPECIMEN DESCRIPTION: ABNORMAL

## 2021-09-28 ENCOUNTER — HOSPITAL ENCOUNTER (INPATIENT)
Age: 21
LOS: 2 days | Discharge: HOME | End: 2021-09-30
Attending: OBSTETRICS & GYNECOLOGY | Admitting: OBSTETRICS & GYNECOLOGY
Payer: COMMERCIAL

## 2021-09-28 ENCOUNTER — ANESTHESIA (OUTPATIENT)
Dept: LABOR AND DELIVERY | Age: 21
End: 2021-09-28
Payer: COMMERCIAL

## 2021-09-28 ENCOUNTER — ANESTHESIA EVENT (OUTPATIENT)
Dept: LABOR AND DELIVERY | Age: 21
End: 2021-09-28
Payer: COMMERCIAL

## 2021-09-28 VITALS — OXYGEN SATURATION: 98 % | DIASTOLIC BLOOD PRESSURE: 66 MMHG | SYSTOLIC BLOOD PRESSURE: 111 MMHG

## 2021-09-28 PROBLEM — Z3A.39 39 WEEKS GESTATION OF PREGNANCY: Status: ACTIVE | Noted: 2021-09-28

## 2021-09-28 LAB
ABO/RH: NORMAL
AMPHETAMINE SCREEN URINE: NEGATIVE
ANTIBODY SCREEN: NEGATIVE
ARM BAND NUMBER: NORMAL
BARBITURATE SCREEN URINE: NEGATIVE
BENZODIAZEPINE SCREEN, URINE: NEGATIVE
BUPRENORPHINE URINE: NEGATIVE
CANNABINOID SCREEN URINE: NEGATIVE
COCAINE METABOLITE, URINE: NEGATIVE
EXPIRATION DATE: NORMAL
HCT VFR BLD CALC: 34.5 % (ref 36.3–47.1)
HEMOGLOBIN: 11.4 G/DL (ref 11.9–15.1)
MCH RBC QN AUTO: 29 PG (ref 25.2–33.5)
MCHC RBC AUTO-ENTMCNC: 33 G/DL (ref 28.4–34.8)
MCV RBC AUTO: 87.8 FL (ref 82.6–102.9)
MDMA URINE: NORMAL
METHADONE SCREEN, URINE: NEGATIVE
METHAMPHETAMINE, URINE: NEGATIVE
NRBC AUTOMATED: 0 PER 100 WBC
OPIATES, URINE: NEGATIVE
OXYCODONE SCREEN URINE: NEGATIVE
PDW BLD-RTO: 13 % (ref 11.8–14.4)
PHENCYCLIDINE, URINE: NEGATIVE
PLATELET # BLD: 192 K/UL (ref 138–453)
PMV BLD AUTO: 9.9 FL (ref 8.1–13.5)
PROPOXYPHENE, URINE: NEGATIVE
RBC # BLD: 3.93 M/UL (ref 3.95–5.11)
TEST INFORMATION: NORMAL
TRICYCLIC ANTIDEPRESSANTS, UR: NEGATIVE
WBC # BLD: 8.8 K/UL (ref 3.5–11.3)

## 2021-09-28 PROCEDURE — 64488 TAP BLOCK BI INJECTION: CPT | Performed by: NURSE ANESTHETIST, CERTIFIED REGISTERED

## 2021-09-28 PROCEDURE — 3700000001 HC ADD 15 MINUTES (ANESTHESIA): Performed by: OBSTETRICS & GYNECOLOGY

## 2021-09-28 PROCEDURE — 6360000002 HC RX W HCPCS: Performed by: OBSTETRICS & GYNECOLOGY

## 2021-09-28 PROCEDURE — 7100000000 HC PACU RECOVERY - FIRST 15 MIN: Performed by: OBSTETRICS & GYNECOLOGY

## 2021-09-28 PROCEDURE — 3609079900 HC CESAREAN SECTION: Performed by: OBSTETRICS & GYNECOLOGY

## 2021-09-28 PROCEDURE — 2580000003 HC RX 258: Performed by: OBSTETRICS & GYNECOLOGY

## 2021-09-28 PROCEDURE — 85027 COMPLETE CBC AUTOMATED: CPT

## 2021-09-28 PROCEDURE — 80306 DRUG TEST PRSMV INSTRMNT: CPT

## 2021-09-28 PROCEDURE — 2709999900 HC NON-CHARGEABLE SUPPLY: Performed by: OBSTETRICS & GYNECOLOGY

## 2021-09-28 PROCEDURE — 6360000002 HC RX W HCPCS: Performed by: NURSE ANESTHETIST, CERTIFIED REGISTERED

## 2021-09-28 PROCEDURE — 86901 BLOOD TYPING SEROLOGIC RH(D): CPT

## 2021-09-28 PROCEDURE — 86850 RBC ANTIBODY SCREEN: CPT

## 2021-09-28 PROCEDURE — 2500000003 HC RX 250 WO HCPCS: Performed by: OBSTETRICS & GYNECOLOGY

## 2021-09-28 PROCEDURE — 3700000000 HC ANESTHESIA ATTENDED CARE: Performed by: OBSTETRICS & GYNECOLOGY

## 2021-09-28 PROCEDURE — 1220000000 HC SEMI PRIVATE OB R&B

## 2021-09-28 PROCEDURE — 7100000001 HC PACU RECOVERY - ADDTL 15 MIN: Performed by: OBSTETRICS & GYNECOLOGY

## 2021-09-28 PROCEDURE — 86900 BLOOD TYPING SEROLOGIC ABO: CPT

## 2021-09-28 PROCEDURE — 6370000000 HC RX 637 (ALT 250 FOR IP): Performed by: OBSTETRICS & GYNECOLOGY

## 2021-09-28 RX ORDER — SODIUM CHLORIDE, SODIUM LACTATE, POTASSIUM CHLORIDE, CALCIUM CHLORIDE 600; 310; 30; 20 MG/100ML; MG/100ML; MG/100ML; MG/100ML
INJECTION, SOLUTION INTRAVENOUS CONTINUOUS
Status: DISCONTINUED | OUTPATIENT
Start: 2021-09-28 | End: 2021-09-30 | Stop reason: HOSPADM

## 2021-09-28 RX ORDER — SODIUM CHLORIDE, SODIUM LACTATE, POTASSIUM CHLORIDE, CALCIUM CHLORIDE 600; 310; 30; 20 MG/100ML; MG/100ML; MG/100ML; MG/100ML
INJECTION, SOLUTION INTRAVENOUS CONTINUOUS
Status: DISCONTINUED | OUTPATIENT
Start: 2021-09-28 | End: 2021-09-28

## 2021-09-28 RX ORDER — SIMETHICONE 80 MG
80 TABLET,CHEWABLE ORAL EVERY 6 HOURS PRN
Status: DISCONTINUED | OUTPATIENT
Start: 2021-09-28 | End: 2021-09-30 | Stop reason: HOSPADM

## 2021-09-28 RX ORDER — DIPHENHYDRAMINE HYDROCHLORIDE 50 MG/ML
25 INJECTION INTRAMUSCULAR; INTRAVENOUS EVERY 6 HOURS PRN
Status: DISCONTINUED | OUTPATIENT
Start: 2021-09-28 | End: 2021-09-30 | Stop reason: HOSPADM

## 2021-09-28 RX ORDER — SODIUM CHLORIDE 0.9 % (FLUSH) 0.9 %
10 SYRINGE (ML) INJECTION EVERY 12 HOURS SCHEDULED
Status: DISCONTINUED | OUTPATIENT
Start: 2021-09-28 | End: 2021-09-28

## 2021-09-28 RX ORDER — NALOXONE HYDROCHLORIDE 0.4 MG/ML
0.4 INJECTION, SOLUTION INTRAMUSCULAR; INTRAVENOUS; SUBCUTANEOUS PRN
Status: DISCONTINUED | OUTPATIENT
Start: 2021-09-28 | End: 2021-09-30 | Stop reason: HOSPADM

## 2021-09-28 RX ORDER — TRISODIUM CITRATE DIHYDRATE AND CITRIC ACID MONOHYDRATE 500; 334 MG/5ML; MG/5ML
30 SOLUTION ORAL ONCE
Status: COMPLETED | OUTPATIENT
Start: 2021-09-28 | End: 2021-09-28

## 2021-09-28 RX ORDER — KETOROLAC TROMETHAMINE 15 MG/ML
30 INJECTION, SOLUTION INTRAMUSCULAR; INTRAVENOUS EVERY 6 HOURS
Status: COMPLETED | OUTPATIENT
Start: 2021-09-28 | End: 2021-09-29

## 2021-09-28 RX ORDER — PRENATAL WITH FERROUS FUM AND FOLIC ACID 3080; 920; 120; 400; 22; 1.84; 3; 20; 10; 1; 12; 200; 27; 25; 2 [IU]/1; [IU]/1; MG/1; [IU]/1; MG/1; MG/1; MG/1; MG/1; MG/1; MG/1; UG/1; MG/1; MG/1; MG/1; MG/1
1 TABLET ORAL DAILY
Status: DISCONTINUED | OUTPATIENT
Start: 2021-09-30 | End: 2021-09-30 | Stop reason: HOSPADM

## 2021-09-28 RX ORDER — ACETAMINOPHEN 325 MG/1
650 TABLET ORAL EVERY 4 HOURS PRN
Status: DISCONTINUED | OUTPATIENT
Start: 2021-09-28 | End: 2021-09-30 | Stop reason: HOSPADM

## 2021-09-28 RX ORDER — SODIUM CHLORIDE 0.9 % (FLUSH) 0.9 %
10 SYRINGE (ML) INJECTION PRN
Status: DISCONTINUED | OUTPATIENT
Start: 2021-09-28 | End: 2021-09-30 | Stop reason: HOSPADM

## 2021-09-28 RX ORDER — METOCLOPRAMIDE HYDROCHLORIDE 5 MG/ML
10 INJECTION INTRAMUSCULAR; INTRAVENOUS ONCE
Status: COMPLETED | OUTPATIENT
Start: 2021-09-28 | End: 2021-09-28

## 2021-09-28 RX ORDER — PHENYLEPHRINE HYDROCHLORIDE 10 MG/ML
INJECTION INTRAVENOUS PRN
Status: DISCONTINUED | OUTPATIENT
Start: 2021-09-28 | End: 2021-09-28 | Stop reason: SDUPTHER

## 2021-09-28 RX ORDER — SODIUM CHLORIDE 9 MG/ML
25 INJECTION, SOLUTION INTRAVENOUS PRN
Status: DISCONTINUED | OUTPATIENT
Start: 2021-09-28 | End: 2021-09-30 | Stop reason: HOSPADM

## 2021-09-28 RX ORDER — DOCUSATE SODIUM 100 MG/1
100 CAPSULE, LIQUID FILLED ORAL 2 TIMES DAILY
Status: DISCONTINUED | OUTPATIENT
Start: 2021-09-28 | End: 2021-09-30 | Stop reason: HOSPADM

## 2021-09-28 RX ORDER — ONDANSETRON 2 MG/ML
INJECTION INTRAMUSCULAR; INTRAVENOUS PRN
Status: DISCONTINUED | OUTPATIENT
Start: 2021-09-28 | End: 2021-09-28 | Stop reason: SDUPTHER

## 2021-09-28 RX ORDER — SODIUM CHLORIDE 9 MG/ML
25 INJECTION, SOLUTION INTRAVENOUS PRN
Status: DISCONTINUED | OUTPATIENT
Start: 2021-09-28 | End: 2021-09-28

## 2021-09-28 RX ORDER — SODIUM CHLORIDE 0.9 % (FLUSH) 0.9 %
10 SYRINGE (ML) INJECTION PRN
Status: DISCONTINUED | OUTPATIENT
Start: 2021-09-28 | End: 2021-09-28

## 2021-09-28 RX ORDER — KETOROLAC TROMETHAMINE 30 MG/ML
INJECTION, SOLUTION INTRAMUSCULAR; INTRAVENOUS PRN
Status: DISCONTINUED | OUTPATIENT
Start: 2021-09-28 | End: 2021-09-28 | Stop reason: SDUPTHER

## 2021-09-28 RX ORDER — OXYCODONE HYDROCHLORIDE AND ACETAMINOPHEN 5; 325 MG/1; MG/1
2 TABLET ORAL EVERY 4 HOURS PRN
Status: DISCONTINUED | OUTPATIENT
Start: 2021-09-28 | End: 2021-09-30 | Stop reason: HOSPADM

## 2021-09-28 RX ORDER — ROPIVACAINE HYDROCHLORIDE 5 MG/ML
INJECTION, SOLUTION EPIDURAL; INFILTRATION; PERINEURAL PRN
Status: DISCONTINUED | OUTPATIENT
Start: 2021-09-28 | End: 2021-09-28 | Stop reason: SDUPTHER

## 2021-09-28 RX ORDER — OXYCODONE HYDROCHLORIDE AND ACETAMINOPHEN 5; 325 MG/1; MG/1
1 TABLET ORAL EVERY 4 HOURS PRN
Status: DISCONTINUED | OUTPATIENT
Start: 2021-09-28 | End: 2021-09-30 | Stop reason: HOSPADM

## 2021-09-28 RX ORDER — METHYLERGONOVINE MALEATE 0.2 MG/ML
200 INJECTION INTRAVENOUS PRN
Status: DISCONTINUED | OUTPATIENT
Start: 2021-09-28 | End: 2021-09-30 | Stop reason: HOSPADM

## 2021-09-28 RX ORDER — SODIUM CHLORIDE, SODIUM LACTATE, POTASSIUM CHLORIDE, CALCIUM CHLORIDE 600; 310; 30; 20 MG/100ML; MG/100ML; MG/100ML; MG/100ML
1000 INJECTION, SOLUTION INTRAVENOUS ONCE
Status: COMPLETED | OUTPATIENT
Start: 2021-09-28 | End: 2021-09-28

## 2021-09-28 RX ORDER — MISOPROSTOL 100 UG/1
800 TABLET ORAL PRN
Status: DISCONTINUED | OUTPATIENT
Start: 2021-09-28 | End: 2021-09-30 | Stop reason: HOSPADM

## 2021-09-28 RX ORDER — NALBUPHINE HCL 10 MG/ML
10 AMPUL (ML) INJECTION EVERY 4 HOURS PRN
Status: DISCONTINUED | OUTPATIENT
Start: 2021-09-28 | End: 2021-09-30 | Stop reason: HOSPADM

## 2021-09-28 RX ORDER — SENNA AND DOCUSATE SODIUM 50; 8.6 MG/1; MG/1
1 TABLET, FILM COATED ORAL DAILY PRN
Status: DISCONTINUED | OUTPATIENT
Start: 2021-09-28 | End: 2021-09-30 | Stop reason: HOSPADM

## 2021-09-28 RX ORDER — SODIUM CHLORIDE 0.9 % (FLUSH) 0.9 %
10 SYRINGE (ML) INJECTION EVERY 12 HOURS SCHEDULED
Status: DISCONTINUED | OUTPATIENT
Start: 2021-09-28 | End: 2021-09-30 | Stop reason: HOSPADM

## 2021-09-28 RX ORDER — DEXAMETHASONE SODIUM PHOSPHATE 10 MG/ML
INJECTION INTRAMUSCULAR; INTRAVENOUS PRN
Status: DISCONTINUED | OUTPATIENT
Start: 2021-09-28 | End: 2021-09-28 | Stop reason: SDUPTHER

## 2021-09-28 RX ORDER — MODIFIED LANOLIN
OINTMENT (GRAM) TOPICAL
Status: DISCONTINUED | OUTPATIENT
Start: 2021-09-28 | End: 2021-09-30 | Stop reason: HOSPADM

## 2021-09-28 RX ORDER — CARBOPROST TROMETHAMINE 250 UG/ML
250 INJECTION, SOLUTION INTRAMUSCULAR PRN
Status: DISCONTINUED | OUTPATIENT
Start: 2021-09-28 | End: 2021-09-30 | Stop reason: HOSPADM

## 2021-09-28 RX ORDER — IBUPROFEN 800 MG/1
800 TABLET ORAL EVERY 8 HOURS
Status: DISCONTINUED | OUTPATIENT
Start: 2021-09-29 | End: 2021-09-30 | Stop reason: HOSPADM

## 2021-09-28 RX ORDER — ONDANSETRON 2 MG/ML
4 INJECTION INTRAMUSCULAR; INTRAVENOUS EVERY 6 HOURS PRN
Status: DISCONTINUED | OUTPATIENT
Start: 2021-09-28 | End: 2021-09-30 | Stop reason: HOSPADM

## 2021-09-28 RX ADMIN — CEFOXITIN SODIUM 2000 MG: 2 POWDER, FOR SOLUTION INTRAVENOUS at 07:25

## 2021-09-28 RX ADMIN — PHENYLEPHRINE HYDROCHLORIDE 50 MCG: 10 INJECTION INTRAVENOUS at 08:16

## 2021-09-28 RX ADMIN — PHENYLEPHRINE HYDROCHLORIDE 50 MCG: 10 INJECTION INTRAVENOUS at 08:07

## 2021-09-28 RX ADMIN — PHENYLEPHRINE HYDROCHLORIDE 50 MCG: 10 INJECTION INTRAVENOUS at 08:03

## 2021-09-28 RX ADMIN — OXYCODONE AND ACETAMINOPHEN 1 TABLET: 5; 325 TABLET ORAL at 10:54

## 2021-09-28 RX ADMIN — DEXAMETHASONE SODIUM PHOSPHATE 10 MG: 10 INJECTION INTRAMUSCULAR; INTRAVENOUS at 08:47

## 2021-09-28 RX ADMIN — METOCLOPRAMIDE 10 MG: 5 INJECTION, SOLUTION INTRAMUSCULAR; INTRAVENOUS at 06:52

## 2021-09-28 RX ADMIN — PHENYLEPHRINE HYDROCHLORIDE 100 MCG: 10 INJECTION INTRAVENOUS at 08:22

## 2021-09-28 RX ADMIN — ENOXAPARIN SODIUM 40 MG: 40 INJECTION SUBCUTANEOUS at 21:25

## 2021-09-28 RX ADMIN — ROPIVACAINE HYDROCHLORIDE 45.6 MG: 5 INJECTION, SOLUTION EPIDURAL; INFILTRATION; PERINEURAL at 08:47

## 2021-09-28 RX ADMIN — Medication 87.3 MILLI-UNITS/MIN: at 08:12

## 2021-09-28 RX ADMIN — PHENYLEPHRINE HYDROCHLORIDE 100 MCG: 10 INJECTION INTRAVENOUS at 08:20

## 2021-09-28 RX ADMIN — Medication 10 UNITS: at 08:11

## 2021-09-28 RX ADMIN — KETOROLAC TROMETHAMINE 30 MG: 15 INJECTION, SOLUTION INTRAMUSCULAR; INTRAVENOUS at 14:49

## 2021-09-28 RX ADMIN — KETOROLAC TROMETHAMINE 30 MG: 15 INJECTION, SOLUTION INTRAMUSCULAR; INTRAVENOUS at 21:24

## 2021-09-28 RX ADMIN — SODIUM CHLORIDE, POTASSIUM CHLORIDE, SODIUM LACTATE AND CALCIUM CHLORIDE: 600; 310; 30; 20 INJECTION, SOLUTION INTRAVENOUS at 12:00

## 2021-09-28 RX ADMIN — OXYCODONE AND ACETAMINOPHEN 1 TABLET: 5; 325 TABLET ORAL at 19:14

## 2021-09-28 RX ADMIN — PHENYLEPHRINE HYDROCHLORIDE 50 MCG: 10 INJECTION INTRAVENOUS at 07:57

## 2021-09-28 RX ADMIN — SODIUM CHLORIDE, POTASSIUM CHLORIDE, SODIUM LACTATE AND CALCIUM CHLORIDE 1000 ML: 600; 310; 30; 20 INJECTION, SOLUTION INTRAVENOUS at 06:03

## 2021-09-28 RX ADMIN — SODIUM CHLORIDE, POTASSIUM CHLORIDE, SODIUM LACTATE AND CALCIUM CHLORIDE: 600; 310; 30; 20 INJECTION, SOLUTION INTRAVENOUS at 06:53

## 2021-09-28 RX ADMIN — PHENYLEPHRINE HYDROCHLORIDE 100 MCG: 10 INJECTION INTRAVENOUS at 08:24

## 2021-09-28 RX ADMIN — DOCUSATE SODIUM 100 MG: 100 CAPSULE, LIQUID FILLED ORAL at 21:25

## 2021-09-28 RX ADMIN — PHENYLEPHRINE HYDROCHLORIDE 50 MCG: 10 INJECTION INTRAVENOUS at 08:00

## 2021-09-28 RX ADMIN — PHENYLEPHRINE HYDROCHLORIDE 50 MCG: 10 INJECTION INTRAVENOUS at 08:12

## 2021-09-28 RX ADMIN — KETOROLAC TROMETHAMINE 30 MG: 30 INJECTION, SOLUTION INTRAMUSCULAR; INTRAVENOUS at 08:36

## 2021-09-28 RX ADMIN — ONDANSETRON 4 MG: 2 INJECTION, SOLUTION INTRAMUSCULAR; INTRAVENOUS at 07:58

## 2021-09-28 RX ADMIN — FAMOTIDINE 20 MG: 10 INJECTION, SOLUTION INTRAVENOUS at 06:52

## 2021-09-28 RX ADMIN — SODIUM CITRATE AND CITRIC ACID MONOHYDRATE 30 ML: 500; 334 SOLUTION ORAL at 06:53

## 2021-09-28 ASSESSMENT — PAIN SCALES - GENERAL
PAINLEVEL_OUTOF10: 4
PAINLEVEL_OUTOF10: 4
PAINLEVEL_OUTOF10: 5
PAINLEVEL_OUTOF10: 1

## 2021-09-28 ASSESSMENT — ENCOUNTER SYMPTOMS: SHORTNESS OF BREATH: 1

## 2021-09-28 ASSESSMENT — PAIN DESCRIPTION - DESCRIPTORS: DESCRIPTORS: CRAMPING

## 2021-09-28 NOTE — H&P
HISTORY AND PHYSICAL             Date: 2021        Patient Name: Robe Willis     YOB: 2000      Age:  23 y.o. Chief Complaint     Chief Complaint   Patient presents with    Scheduled            History Obtained From   patient    History of Present Illness   Pt with previa throughout pregnancy - last usn suggested that placenta had migrated to >2 cm, but pt continued to have intermittent bleeding and opted for     Past Medical History     Past Medical History:   Diagnosis Date    Asthma     rescue inhaler and daily use of flow vent inhaler    Asthma     Complication of anesthesia     Nausea    Mental disorder     anxiety    Mental disorder         Past Surgical History     Past Surgical History:   Procedure Laterality Date    ABDOMINAL EXPLORATION SURGERY  09/15/2019    RT Oophorectomy    LAPAROSCOPY Left 9/15/2019    EXPLORATORY LAPAROTOMY, RIGHT OOPHRECTOMY performed by Aurelio Evans MD at [address]          Medications Prior to Admission     Prior to Admission medications    Medication Sig Start Date End Date Taking? Authorizing Provider   butalbital-acetaminophen-caffeine (FIORICET, ESGIC) -42 MG per tablet 1-2 tablets every 6 hours prn headache, no more than 6 tablets in 24 hours 21  Yes DEMETRIO Willis - ROSIE   FLOVENT HFA 44 MCG/ACT inhaler  21  Yes Historical Provider, MD   Prenatal Vit-Fe Fumarate-FA (PRENATAL VITAMIN PO) Take by mouth   Yes Historical Provider, MD   loratadine (CLARITIN) 10 MG capsule Take 10 mg by mouth daily   Yes Historical Provider, MD   VENTOLIN  (90 Base) MCG/ACT inhaler  20   Historical Provider, MD   acetaminophen (TYLENOL) 325 MG tablet Take 650 mg by mouth every 4 hours as needed for Pain  Patient not taking: Reported on 2021    Historical Provider, MD        Allergies   Patient has no known allergies.     Social History     Social History     Tobacco History Smoking Status  Never Smoker    Smokeless Tobacco Use  Never Used          Alcohol History     Alcohol Use Status  Not Currently          Drug Use     Drug Use Status  Not Currently          Sexual Activity     Sexually Active  Not Currently Partners  Male                Family History     Family History   Problem Relation Age of Onset    Heart Defect Maternal Grandfather     Asthma Father     No Known Problems Mother     Heart Defect Sister     Other Other         No family hx stroke or ovarian cancer    Breast Cancer Maternal Great Grandmother        Review of Systems   Review of Systems   Constitutional: Negative for chills and fever. Genitourinary: Negative for pelvic pain and vaginal discharge. Physical Exam   /72   Pulse 69   Temp 98.4 °F (36.9 °C)   Resp 16   Ht 5' 7\" (1.702 m)   Wt 167 lb (75.8 kg)   LMP 01/12/2021 (Approximate)   BMI 26.16 kg/m²     Physical Exam  Constitutional:       Appearance: Normal appearance. HENT:      Head: Normocephalic and atraumatic. Eyes:      Extraocular Movements: Extraocular movements intact. Pupils: Pupils are equal, round, and reactive to light. Cardiovascular:      Rate and Rhythm: Normal rate. Pulmonary:      Effort: Pulmonary effort is normal.   Musculoskeletal:         General: Normal range of motion. Cervical back: Normal range of motion. Skin:     General: Skin is warm and dry. Neurological:      Mental Status: She is alert and oriented to person, place, and time. Psychiatric:         Mood and Affect: Mood normal.         Behavior: Behavior normal.         Thought Content:  Thought content normal.         Judgment: Judgment normal.         Labs      Recent Results (from the past 24 hour(s))   DRUG SCREEN MULTI URINE    Collection Time: 09/28/21  5:50 AM   Result Value Ref Range    Amphetamine Screen, Ur NEGATIVE NEGATIVE    Barbiturate Screen, Ur NEGATIVE NEGATIVE    Benzodiazepine Screen, Urine NEGATIVE NEGATIVE Cocaine Metabolite, Urine NEGATIVE NEGATIVE    Methadone Screen, Urine NEGATIVE NEGATIVE    Opiates, Urine NEGATIVE NEGATIVE    Phencyclidine, Urine NEGATIVE NEGATIVE    Propoxyphene, Urine NEGATIVE NEGATIVE    Cannabinoid Scrn, Ur NEGATIVE NEGATIVE    Oxycodone Screen, Ur NEGATIVE NEGATIVE    Methamphetamine, Urine NEGATIVE NEGATIVE    Tricyclic Antidepressants, Urine NEGATIVE NEGATIVE    MDMA, Urine NOT REPORTED NEGATIVE    Buprenorphine Urine NEGATIVE NEGATIVE    Test Information NOT REPORTED    CBC    Collection Time: 09/28/21  6:14 AM   Result Value Ref Range    WBC 8.8 3.5 - 11.3 k/uL    RBC 3.93 (L) 3.95 - 5.11 m/uL    Hemoglobin 11.4 (L) 11.9 - 15.1 g/dL    Hematocrit 34.5 (L) 36.3 - 47.1 %    MCV 87.8 82.6 - 102.9 fL    MCH 29.0 25.2 - 33.5 pg    MCHC 33.0 28.4 - 34.8 g/dL    RDW 13.0 11.8 - 14.4 %    Platelets 031 450 - 799 k/uL    MPV 9.9 8.1 - 13.5 fL    NRBC Automated 0.0 0.0 per 100 WBC        Imaging/Diagnostics Last 24 Hours   No results found. Assessment      IUP at term  Low lying placenta  Plan   1.  Primary section    Consultations Ordered:  None    Electronically signed by Jerry Orozco DO on 9/28/21 at 7:38 AM EDT

## 2021-09-28 NOTE — OP NOTE
Department of Obstetrics and Gynecology   Section Note    Indications: marginal placenta previa, subchorionic bleed    Pre-operative Diagnosis: 37 week 0 day pregnancy. Post-operative Diagnosis: Living  infant(s) and Male    Surgeon: Inge Saldaña DO     Assistants: Pandora Rubinstein    Anesthesia: Spinal anesthesia    ASA Class: 2    Procedure Details   The patient was seen in the Holding Room. The risks, benefits, complications, treatment options, and expected outcomes were discussed with the patient. The patient concurred with the proposed plan, giving informed consent. The site of surgery properly noted/marked. The patient was taken to Operating Room # 1, identified as Shaye Quinteros and the procedure verified as  Delivery. A Time Out was held and the above information confirmed. After induction of anesthesia, the patient was draped and prepped in the usual sterile manner. A Pfannenstiel incision was made and carried down through the subcutaneous tissue to the fascia. Fascial incision was made and extended transversely. The fascia was  from the underlying rectus tissue superiorly and inferiorly. The peritoneum was identified and entered. Peritoneal incision was extended longitudinally. A low transverse uterine incision was made. Delivered from vertex presentation was a viable infant with Apgar scores of 8 at one minute and 9 at five minutes. After the umbilical cord was clamped and cut cord blood was obtained for evaluation. The placenta was removed intact and appeared normal. The uterus was exteriorized. The uterine outline, tubes and ovaries appeared normal. The uterine incision was closed with running locked sutures of 0 Vicryl. Hemostasis was observed. The uterus was returned to the abdomen. The gutters were cleared of all clot and debris. Hemostasis was assure. The peritoneum was closed with 3-0 Vicryl.  The fascia was then reapproximated with running sutures of 0 Vicryl. The subcutaneous layer was closed with 3-0 Vicryl. The skin was reapproximated with 4-0 Monocryl. Instrument, sponge, and needle counts were correct prior the abdominal closure and at the conclusion of the case. Findings: placenta extended posteriorly into lower uterine segment toward cervix; evidence of old    Urine: clear    EBL: 800 mL         Fluids: as per anesthesia record                Specimens: placenta, cord blood                  Complications:  none                 Condition: infant stable to general nursery and mother stable    Attending Attestation: I was present and scrubbed for the entire procedure.

## 2021-09-28 NOTE — PLAN OF CARE
Problem: Venous Thromboembolism:  Goal: Will show no signs or symptoms of venous thromboembolism  Description: Will show no signs or symptoms of venous thromboembolism  Outcome: Ongoing  Goal: Absence of signs or symptoms of impaired coagulation  Description: Absence of signs or symptoms of impaired coagulation  Outcome: Ongoing     Problem: Urinary Retention:  Goal: Urinary elimination within specified parameters  Description: Urinary elimination within specified parameters  Outcome: Ongoing     Problem: Pain - Acute:  Goal: Pain level will decrease  Description: Pain level will decrease  Outcome: Ongoing     Problem: Nausea/Vomiting:  Goal: Absence of nausea/vomiting  Description: Absence of nausea/vomiting  Outcome: Ongoing     Problem: Mood - Altered:  Goal: Mood stable  Description: Mood stable  Outcome: Ongoing     Problem: Infection - Surgical Site:  Goal: Will show no infection signs and symptoms  Description: Will show no infection signs and symptoms  Outcome: Ongoing     Problem: Fluid Volume - Imbalance:  Goal: Absence of postpartum hemorrhage signs and symptoms  Description: Absence of postpartum hemorrhage signs and symptoms  Outcome: Ongoing  Goal: Absence of imbalanced fluid volume signs and symptoms  Description: Absence of imbalanced fluid volume signs and symptoms  Outcome: Ongoing     Problem: Discharge Planning:  Goal: Discharged to appropriate level of care  Description: Discharged to appropriate level of care  Outcome: Ongoing     Problem: Venous Thromboembolism - Risk of:  Goal: Will show no signs or symptoms of venous thromboembolism  Description: Will show no signs or symptoms of venous thromboembolism  9/28/2021 1626 by Mayda Wright RN  Outcome: Completed  9/28/2021 0530 by Brianna Barron RN  Outcome: Ongoing     Problem: Tissue Perfusion - Uteroplacental, Altered:  Description: [TRUNCATED] For intrapartum patients with recurrent variable decelerations of the fetal heart rate,

## 2021-09-28 NOTE — FLOWSHEET NOTE
Patient arrives per wheelchair to unit with SO for scheduled . She states this is a high risk pregnancy. Oriented to room 207 and CHG wipes explained.

## 2021-09-28 NOTE — LACTATION NOTE
Lactation education:    [x] Latch/ good latch vs shallow latch/ steps to obtaining deep latch    [x] How to know if infant is eating enough/ feedings per 24 hours, wet/dirty diapers    [x] Feeding/satiety cues      Lactation education resources given:     [x]  How to Breastfeed your baby - [address] publication      [x]  Information on feeding cues     [x]  Support group handout    [x]  Breastpump cleaning guidelines - [facility]     [x]  Breastfeeding & Safe Sleep handout - [address] publication    [x]  Calling All Dads! Handout - [address] publication    []  Breast and Nipple Care - [place]     []  [address]    []  [place]    []  Going Back to Work - [place]    []  Preventing Engorgement - [facility]    Supplies given:    []  Brush, soap and basin for breastpump cleaning    []  Insurance pump provided     []  [facility] pump set up for patient to use    Explained to patient, patient verbalizes understanding.         Signed:  Steve Bass RN, BSN, IBCLC

## 2021-09-28 NOTE — ANESTHESIA PROCEDURE NOTES
Peripheral Block    Patient location during procedure: OR  Start time: 9/28/2021 8:38 AM  End time: 9/28/2021 8:47 AM  Staffing  Performed: resident/CRNA   Resident/CRNA: DEMETRIO Pacheco CRNA  Preanesthetic Checklist  Completed: patient identified, IV checked, site marked, risks and benefits discussed, surgical consent, monitors and equipment checked, pre-op evaluation, timeout performed, anesthesia consent given, oxygen available and patient being monitored  Peripheral Block  Patient position: supine  Prep: ChloraPrep  Patient monitoring: cardiac monitor, continuous pulse ox, IV access and frequent blood pressure checks  Block type: TAP  Laterality: bilateral  Injection technique: single-shot  Guidance: ultrasound guided  Local infiltration: bupivacaine and decadron  Infiltration strength: 0.5 %  Dose: 45.6 mL  Provider prep: sterile gloves and mask  Local infiltration: bupivacaine and decadron  Needle  Needle type:  Other   Needle gauge: 22 G  Needle length: 11 cm  Needle localization: ultrasound guidanceOther needle type: [place]  Assessment  Injection assessment: negative aspiration for heme, no paresthesia on injection and local visualized surrounding nerve on ultrasound  Paresthesia pain: none  Slow fractionated injection: yes  Hemodynamics: stable  Reason for block: post-op pain management and at surgeon's request

## 2021-09-28 NOTE — ANESTHESIA PRE PROCEDURE
Department of Anesthesiology  Preprocedure Note       Name:  Sheyla Carl   Age:  23 y.o.  :  2000                                          MRN:  [id number]         Date:  2021      Surgeon: Harmony Hendrickson):  Alpa Clifton DO    Procedure: Procedure(s):   SECTION    Medications prior to admission:   Prior to Admission medications    Medication Sig Start Date End Date Taking?  Authorizing Provider   butalbital-acetaminophen-caffeine (FIORICET, ESGIC) -82 MG per tablet 1-2 tablets every 6 hours prn headache, no more than 6 tablets in 24 hours 21  Yes Boni Gamboa, APRN - CNM   FLOVENT HFA 44 MCG/ACT inhaler  21  Yes Historical Provider, MD   Prenatal Vit-Fe Fumarate-FA (PRENATAL VITAMIN PO) Take by mouth   Yes Historical Provider, MD   loratadine (CLARITIN) 10 MG capsule Take 10 mg by mouth daily   Yes Historical Provider, MD   VENTOLIN  (90 Base) MCG/ACT inhaler  20   Historical Provider, MD   acetaminophen (TYLENOL) 325 MG tablet Take 650 mg by mouth every 4 hours as needed for Pain  Patient not taking: Reported on 2021    Historical Provider, MD       Current medications:    Current Facility-Administered Medications   Medication Dose Route Frequency Provider Last Rate Last Admin    lactated ringers infusion   IntraVENous Continuous Alpa Clifton  mL/hr at 21 0653 New Bag at 21 0653    sodium chloride flush 0.9 % injection 10 mL  10 mL IntraVENous 2 times per day Alpa Clifton DO        sodium chloride flush 0.9 % injection 10 mL  10 mL IntraVENous PRN Alpa Clifton DO        0.9 % sodium chloride infusion  25 mL IntraVENous PRN Marcy Staples DO        cefOXitin (MEFOXIN) 2000 mg in dextrose 5% 50 mL (mini-bag)  2,000 mg IntraVENous On Call to Affectv, DO        oxytocin (PITOCIN) 30 units in 500 mL infusion  87.3 emiliano-units/min IntraVENous Continuous PRN Bryant Colby 2330                        Date of last liquid consumption: 09/27/21                        Date of last solid food consumption: 09/27/21    BMI:   Wt Readings from Last 3 Encounters:   09/28/21 167 lb (75.8 kg)   09/21/21 167 lb (75.8 kg)   09/13/21 168 lb (76.2 kg)     Body mass index is 26.16 kg/m². CBC:   Lab Results   Component Value Date    WBC 8.8 09/28/2021    RBC 3.93 09/28/2021    HGB 11.4 09/28/2021    HCT 34.5 09/28/2021    MCV 87.8 09/28/2021    RDW 13.0 09/28/2021     09/28/2021       CMP:   Lab Results   Component Value Date     09/11/2021    K 3.7 09/11/2021     09/11/2021    CO2 17 09/11/2021    BUN 6 09/11/2021    CREATININE 0.35 09/11/2021    GFRAA >60 09/11/2021    LABGLOM >60 09/11/2021    GLUCOSE 116 09/11/2021    PROT 6.4 09/11/2021    CALCIUM 9.0 09/11/2021    BILITOT 0.61 09/11/2021    ALKPHOS 116 09/11/2021    AST 11 09/11/2021    ALT 6 09/11/2021       POC Tests: No results for input(s): POCGLU, POCNA, POCK, POCCL, POCBUN, POCHEMO, POCHCT in the last 72 hours. Coags:   Lab Results   Component Value Date    PROTIME 9.3 07/03/2021    INR 0.9 07/03/2021    APTT 27.3 07/03/2021       HCG (If Applicable):   Lab Results   Component Value Date    PREGTESTUR Positive 03/10/2021    HCGQUANT <1 09/13/2019        ABGs: No results found for: PHART, PO2ART, VRA3JGC, RKU0WVM, BEART, V9WGZTNK     Type & Screen (If Applicable):  No results found for: LABABO, LABRH    Drug/Infectious Status (If Applicable):  Lab Results   Component Value Date    HEPCAB NONREACTIVE 03/10/2021       COVID-19 Screening (If Applicable):   Lab Results   Component Value Date    COVID19 Not Detected 07/03/2021           Anesthesia Evaluation  Patient summary reviewed and Nursing notes reviewed   history of anesthetic complications: PONV.   Airway: Mallampati: II  TM distance: >3 FB   Neck ROM: full  Mouth opening: > = 3 FB Dental: normal exam         Pulmonary:normal exam  breath sounds clear to auscultation  (+) shortness of breath:  asthma (rescue inhaler 3-4 days ago): exercise-induced asthma, allergic asthma,                            Cardiovascular:  Exercise tolerance: good (>4 METS),           Rhythm: regular  Rate: normal           Beta Blocker:  Not on Beta Blocker         Neuro/Psych:   (+) psychiatric history (anxiety):            GI/Hepatic/Renal: Neg GI/Hepatic/Renal ROS       (-) GERD       Endo/Other: Negative Endo/Other ROS                     ROS comment: Placental previa Abdominal:   (+) obese,           Vascular: negative vascular ROS. Other Findings:             Anesthesia Plan      spinal     ASA 3       Induction: intravenous. MIPS: Prophylactic antiemetics administered. Anesthetic plan and risks discussed with spouse. Plan discussed with CRNA.                   Kay Casanova, APRN - CRNA   9/28/2021

## 2021-09-28 NOTE — PLAN OF CARE
Problem: Anxiety:  Goal: Level of anxiety will decrease  Description: Level of anxiety will decrease  Outcome: Ongoing     Problem: Aspiration - Risk of:  Goal: Absence of aspiration  Description: Absence of aspiration  Outcome: Ongoing     Problem: Tissue Perfusion - Uteroplacental, Altered:  Description: [TRUNCATED] For intrapartum patients with recurrent variable decelerations of the fetal heart rate, consider transcervical amnioinfusion. - For patients in labor, avoid prophylactic use of continuous maternal oxygen supplementation to prevent nonreas . Daniel Sahil Daniel Sahil [TRUNCATED] For intrapartum patients with recurrent variable decelerations of the fetal heart rate, consider transcervical amnioinfusion. - For patients in labor, avoid prophylactic use of continuous maternal oxygen supplementation to prevent nonreas . Daniel Sahil Daniel Sahil [TRUNCATED] For intrapartum patients with recurrent variable decelerations of the fetal heart rate, consider transcervical amnioinfusion. - For patients in labor, avoid prophylactic use of continuous maternal oxygen supplementation to prevent nonreas . Daniel Sahil Daniel Sahil [TRUNCATED] For intrapartum patients with recurrent variable decelerations of the fetal heart rate, consider transcervical amnioinfusion. - For patients in labor, avoid prophylactic use of continuous maternal oxygen supplementation to prevent nonreas . Daniel Sahil Daniel Sahil [TRUNCATED] For intrapartum patients with recurrent variable decelerations of the fetal heart rate, consider transcervical amnioinfusion. - For patients in labor, avoid prophylactic use of continuous maternal oxygen supplementation to prevent nonreas . ..   Goal: Absence of abnormal fetal heart rate pattern  Description: Absence of abnormal fetal heart rate pattern  Outcome: Ongoing     Problem: Venous Thromboembolism - Risk of:  Goal: Will show no signs or symptoms of venous thromboembolism  Description: Will show no signs or symptoms of venous thromboembolism  Outcome: Ongoing

## 2021-09-28 NOTE — ANESTHESIA PROCEDURE NOTES
Spinal Block    Patient location during procedure: OR  Start time: 9/28/2021 7:45 AM  End time: 9/28/2021 7:51 AM  Reason for block: primary anesthetic  Staffing  Performed: resident/CRNA   Resident/CRNA: DEMETRIO Mendez CRNA  Preanesthetic Checklist  Completed: patient identified, IV checked, site marked, risks and benefits discussed, surgical consent, monitors and equipment checked, pre-op evaluation, timeout performed, anesthesia consent given, oxygen available and patient being monitored  Spinal Block  Patient position: sitting  Prep: ChloraPrep  Patient monitoring: cardiac monitor, continuous pulse ox and frequent blood pressure checks  Approach: midline  Location: L3/L4  Guidance: Anatomy  Provider prep: sterile gloves and mask  Local infiltration: lidocaine  Agent: bupivacaine  Needle  Needle type: Pencan   Needle gauge: 25 G  Needle length: 3.5 in  Assessment  Sensory level: T4  Swirl obtained: Yes  CSF: clear  Attempts: 1  Hemodynamics: stable  Additional Notes  (2  ml) of Bupivacaine HCL 0.75% with Dextrose 8.25% Given

## 2021-09-29 PROBLEM — O44.03 PLACENTA PREVIA ANTEPARTUM, THIRD TRIMESTER: Status: ACTIVE | Noted: 2021-07-03

## 2021-09-29 LAB — HEMOGLOBIN: 9.3 G/DL (ref 11.9–15.1)

## 2021-09-29 PROCEDURE — 6360000002 HC RX W HCPCS: Performed by: OBSTETRICS & GYNECOLOGY

## 2021-09-29 PROCEDURE — 1220000000 HC SEMI PRIVATE OB R&B

## 2021-09-29 PROCEDURE — 36415 COLL VENOUS BLD VENIPUNCTURE: CPT

## 2021-09-29 PROCEDURE — 59510 CESAREAN DELIVERY: CPT | Performed by: OBSTETRICS & GYNECOLOGY

## 2021-09-29 PROCEDURE — 2580000003 HC RX 258: Performed by: OBSTETRICS & GYNECOLOGY

## 2021-09-29 PROCEDURE — 6370000000 HC RX 637 (ALT 250 FOR IP): Performed by: OBSTETRICS & GYNECOLOGY

## 2021-09-29 PROCEDURE — 85018 HEMOGLOBIN: CPT

## 2021-09-29 RX ADMIN — DOCUSATE SODIUM 100 MG: 100 CAPSULE, LIQUID FILLED ORAL at 09:00

## 2021-09-29 RX ADMIN — Medication: at 18:14

## 2021-09-29 RX ADMIN — OXYCODONE AND ACETAMINOPHEN 2 TABLET: 5; 325 TABLET ORAL at 14:00

## 2021-09-29 RX ADMIN — DOCUSATE SODIUM 100 MG: 100 CAPSULE, LIQUID FILLED ORAL at 20:00

## 2021-09-29 RX ADMIN — OXYCODONE AND ACETAMINOPHEN 1 TABLET: 5; 325 TABLET ORAL at 23:30

## 2021-09-29 RX ADMIN — KETOROLAC TROMETHAMINE 30 MG: 15 INJECTION, SOLUTION INTRAMUSCULAR; INTRAVENOUS at 02:47

## 2021-09-29 RX ADMIN — KETOROLAC TROMETHAMINE 30 MG: 15 INJECTION, SOLUTION INTRAMUSCULAR; INTRAVENOUS at 09:29

## 2021-09-29 RX ADMIN — OXYCODONE AND ACETAMINOPHEN 2 TABLET: 5; 325 TABLET ORAL at 00:02

## 2021-09-29 RX ADMIN — SODIUM CHLORIDE, PRESERVATIVE FREE 10 ML: 5 INJECTION INTRAVENOUS at 09:29

## 2021-09-29 RX ADMIN — OXYCODONE AND ACETAMINOPHEN 2 TABLET: 5; 325 TABLET ORAL at 05:33

## 2021-09-29 RX ADMIN — SODIUM CHLORIDE, POTASSIUM CHLORIDE, SODIUM LACTATE AND CALCIUM CHLORIDE: 600; 310; 30; 20 INJECTION, SOLUTION INTRAVENOUS at 05:32

## 2021-09-29 RX ADMIN — ENOXAPARIN SODIUM 40 MG: 40 INJECTION SUBCUTANEOUS at 20:00

## 2021-09-29 RX ADMIN — IBUPROFEN 800 MG: 800 TABLET, FILM COATED ORAL at 15:39

## 2021-09-29 RX ADMIN — IBUPROFEN 800 MG: 800 TABLET, FILM COATED ORAL at 23:25

## 2021-09-29 RX ADMIN — SODIUM CHLORIDE, PRESERVATIVE FREE 10 ML: 5 INJECTION INTRAVENOUS at 20:02

## 2021-09-29 ASSESSMENT — PAIN SCALES - GENERAL
PAINLEVEL_OUTOF10: 8
PAINLEVEL_OUTOF10: 3
PAINLEVEL_OUTOF10: 7
PAINLEVEL_OUTOF10: 5
PAINLEVEL_OUTOF10: 7
PAINLEVEL_OUTOF10: 4

## 2021-09-29 NOTE — PROGRESS NOTES
C/S  Labor and Delivery       Post Partum Progress Note           SUBJECTIVE: Patient reports she is feeling well this morning, is resting in bed. States she has been through the night to void without having issues. Denies lightheadedness. Denies headaches, vision changes, chest pain, shortness of breath. Flatus:Present  BM:no  Voiding: no concerns  Diet: Tolerating regular diet   Ambulation: Ambulates without difficulty    OBJECTIVE:      Vitals:  BP (!) 101/58   Pulse 80   Temp 98 °F (36.7 °C) (Oral)   Resp 18   Ht 5' 7\" (1.702 m)   Wt 167 lb (75.8 kg)   LMP 01/12/2021 (Approximate)   Breastfeeding Unknown   BMI 26.16 kg/m²   Patient Vitals for the past 24 hrs:   BP Temp Temp src Pulse Resp   09/29/21 0746 (!) 101/58 98 °F (36.7 °C) Oral 80 18   09/29/21 0511 (!) 117/59 97.4 °F (36.3 °C) -- 73 --   09/29/21 0258 (!) 99/58 98.3 °F (36.8 °C) -- 88 18   09/29/21 0051 117/60 97.8 °F (36.6 °C) -- 76 18   09/28/21 2125 (!) 104/58 98.4 °F (36.9 °C) -- 69 18   09/28/21 1620 108/61 97.7 °F (36.5 °C) -- 78 16   09/28/21 1133 (!) 144/74 97.5 °F (36.4 °C) Oral 80 16   09/28/21 1100 (!) 145/77 -- -- 82 16   09/28/21 1045 139/73 -- -- 74 16   09/28/21 1030 (!) 141/70 -- -- 81 16   09/28/21 1015 (!) 143/72 -- -- 72 16       ABDOMEN:  Soft, appropriately tender. FFM U/1  INCISION: dressing in place, clean, dry and intact  Cor: RRR no Murmurs  Pulmonary: clear to auscultation anterior and posterior  Extremities: no Clubbing cyanosis or ecchymosis  Psych: Appears to be bonding well with infant.      DATA:    CBC:   Lab Results   Component Value Date    WBC 8.8 09/28/2021    RBC 3.93 09/28/2021    HGB 9.3 09/29/2021    HCT 34.5 09/28/2021    MCV 87.8 09/28/2021    MCH 29.0 09/28/2021    MCHC 33.0 09/28/2021    RDW 13.0 09/28/2021     09/28/2021    MPV 9.9 09/28/2021       ASSESSMENT:      Active Problems:    39 weeks gestation of pregnancy     S/P C/S post op day # 1     PLAN:  Routine postoperative and postpartum

## 2021-09-29 NOTE — PLAN OF CARE
Problem: Anxiety:  Goal: Level of anxiety will decrease  Description: Level of anxiety will decrease  Outcome: Ongoing     Problem: Aspiration - Risk of:  Goal: Absence of aspiration  Description: Absence of aspiration  Outcome: Ongoing     Problem: Tissue Perfusion - Uteroplacental, Altered:  Goal: Absence of abnormal fetal heart rate pattern  Description: Absence of abnormal fetal heart rate pattern  Outcome: Ongoing     Problem: Venous Thromboembolism - Risk of:  Goal: Will show no signs or symptoms of venous thromboembolism  Description: Will show no signs or symptoms of venous thromboembolism  9/29/2021 1926 by Freddy Mcgrath RN  Outcome: Ongoing  9/29/2021 1918 by Freddy Mcgrath RN  Outcome: Ongoing     Problem: Discharge Planning:  Goal: Discharged to appropriate level of care  Description: Discharged to appropriate level of care  9/29/2021 1926 by Freddy Mcgrath RN  Outcome: Ongoing  9/29/2021 1918 by Freddy Mcgrath RN  Outcome: Ongoing     Problem: Fluid Volume - Imbalance:  Goal: Absence of postpartum hemorrhage signs and symptoms  Description: Absence of postpartum hemorrhage signs and symptoms  9/29/2021 1926 by Freddy Mcgrath RN  Outcome: Ongoing  9/29/2021 1918 by Freddy Mcgrath RN  Outcome: Ongoing  Goal: Absence of imbalanced fluid volume signs and symptoms  Description: Absence of imbalanced fluid volume signs and symptoms  9/29/2021 1926 by Freddy Mcgrath RN  Outcome: Ongoing  9/29/2021 1918 by Freddy Mcgrath RN  Outcome: Ongoing     Problem: Infection - Surgical Site:  Goal: Will show no infection signs and symptoms  Description: Will show no infection signs and symptoms  9/29/2021 1926 by Freddy Mcgrath RN  Outcome: Ongoing  9/29/2021 1918 by Freddy Mcgrath RN  Outcome: Ongoing     Problem: Mood - Altered:  Goal: Mood stable  Description: Mood stable  9/29/2021 1926 by Freddy Mcgrath RN  Outcome: Ongoing  9/29/2021 1918 by Freddy Mcgrath RN  Outcome: Ongoing     Problem: Nausea/Vomiting:  Goal: Absence of nausea/vomiting  Description: Absence of nausea/vomiting  9/29/2021 1926 by Leslie Wyatt RN  Outcome: Ongoing  9/29/2021 1918 by Leslie Wyatt RN  Outcome: Ongoing     Problem: Pain - Acute:  Goal: Pain level will decrease  Description: Pain level will decrease  9/29/2021 1926 by Leslie Wyatt RN  Outcome: Ongoing  9/29/2021 1918 by Leslie Wyatt RN  Outcome: Ongoing     Problem: Urinary Retention:  Goal: Urinary elimination within specified parameters  Description: Urinary elimination within specified parameters  9/29/2021 1926 by Leslie Wyatt RN  Outcome: Ongoing  9/29/2021 1918 by Leslie Wyatt RN  Outcome: Ongoing     Problem: Venous Thromboembolism:  Goal: Will show no signs or symptoms of venous thromboembolism  Description: Will show no signs or symptoms of venous thromboembolism  9/29/2021 1926 by Leslie Wyatt RN  Outcome: Ongoing  9/29/2021 1918 by Leslie Wyatt RN  Outcome: Ongoing  Goal: Absence of signs or symptoms of impaired coagulation  Description: Absence of signs or symptoms of impaired coagulation  9/29/2021 1926 by Leslie Wyatt RN  Outcome: Ongoing  9/29/2021 1918 by Leslie Wyatt RN  Outcome: Ongoing     Problem: Pain:  Goal: Pain level will decrease  Description: Pain level will decrease  9/29/2021 1926 by Leslie Wyatt RN  Outcome: Ongoing  9/29/2021 1918 by Leslie Wyatt RN  Outcome: Ongoing  Goal: Control of acute pain  Description: Control of acute pain  9/29/2021 1926 by Leslie Wyatt RN  Outcome: Ongoing  9/29/2021 1918 by Leslie Wyatt RN  Outcome: Ongoing  Goal: Control of chronic pain  Description: Control of chronic pain  9/29/2021 1926 by Leslie Wyatt RN  Outcome: Ongoing  9/29/2021 1918 by Leslie Wyatt RN  Outcome: Ongoing

## 2021-09-29 NOTE — ANESTHESIA POSTPROCEDURE EVALUATION
Department of Anesthesiology  Postprocedure Note    Patient: Lisha Lynch  MRN: [id number]  YOB: 2000  Date of evaluation: 2021  Time:  11:15 AM     Procedure Summary     Date: 21 Room / Location: [facility]    Anesthesia Start: 9270 Anesthesia Stop:     Procedure:  SECTION (N/A Abdomen) Diagnosis: (36.6 wks gestation, placenta previa, primary , Surgery to provide assist)    Surgeons: Emma Romero DO Responsible Provider: DEMETRIO Zelaya CRNA    Anesthesia Type: spinal ASA Status: 3          Anesthesia Type: spinal    Hardy Phase I:      Hardy Phase II:      Last vitals: Reviewed and per EMR flowsheets. Anesthesia Post Evaluation    Patient location during evaluation: bedside  Patient participation: complete - patient participated  Level of consciousness: awake and alert  Pain score: 4  Airway patency: patent  Nausea & Vomiting: no nausea and no vomiting  Complications: no  Cardiovascular status: hemodynamically stable  Respiratory status: acceptable  Hydration status: stable  Comments: Patient reports deep cramping pain still but says that the oral pain medications are helping.

## 2021-09-30 VITALS
BODY MASS INDEX: 26.21 KG/M2 | TEMPERATURE: 60.8 F | WEIGHT: 167 LBS | DIASTOLIC BLOOD PRESSURE: 56 MMHG | HEART RATE: 75 BPM | RESPIRATION RATE: 16 BRPM | SYSTOLIC BLOOD PRESSURE: 92 MMHG | HEIGHT: 67 IN

## 2021-09-30 PROBLEM — Z3A.39 39 WEEKS GESTATION OF PREGNANCY: Status: RESOLVED | Noted: 2021-09-28 | Resolved: 2021-09-30

## 2021-09-30 PROBLEM — O44.03 PLACENTA PREVIA ANTEPARTUM, THIRD TRIMESTER: Status: RESOLVED | Noted: 2021-07-03 | Resolved: 2021-09-30

## 2021-09-30 PROCEDURE — 6370000000 HC RX 637 (ALT 250 FOR IP): Performed by: OBSTETRICS & GYNECOLOGY

## 2021-09-30 PROCEDURE — 90707 MMR VACCINE SC: CPT | Performed by: OBSTETRICS & GYNECOLOGY

## 2021-09-30 PROCEDURE — 90471 IMMUNIZATION ADMIN: CPT | Performed by: OBSTETRICS & GYNECOLOGY

## 2021-09-30 PROCEDURE — 99024 POSTOP FOLLOW-UP VISIT: CPT | Performed by: ADVANCED PRACTICE MIDWIFE

## 2021-09-30 PROCEDURE — 6360000002 HC RX W HCPCS: Performed by: OBSTETRICS & GYNECOLOGY

## 2021-09-30 RX ORDER — IBUPROFEN 800 MG/1
800 TABLET ORAL EVERY 8 HOURS PRN
Qty: 30 TABLET | Refills: 1 | Status: SHIPPED | OUTPATIENT
Start: 2021-09-30

## 2021-09-30 RX ORDER — OXYCODONE HYDROCHLORIDE AND ACETAMINOPHEN 5; 325 MG/1; MG/1
1 TABLET ORAL EVERY 6 HOURS PRN
Qty: 28 TABLET | Refills: 0 | Status: SHIPPED | OUTPATIENT
Start: 2021-09-30 | End: 2021-10-07

## 2021-09-30 RX ADMIN — OXYCODONE AND ACETAMINOPHEN 1 TABLET: 5; 325 TABLET ORAL at 11:35

## 2021-09-30 RX ADMIN — MEASLES, MUMPS, AND RUBELLA VIRUS VACCINE LIVE 0.5 ML: 1000; 12500; 1000 INJECTION, POWDER, LYOPHILIZED, FOR SUSPENSION SUBCUTANEOUS at 11:36

## 2021-09-30 RX ADMIN — IBUPROFEN 800 MG: 800 TABLET, FILM COATED ORAL at 07:47

## 2021-09-30 RX ADMIN — PRENATAL WITH FERROUS FUM AND FOLIC ACID 1 TABLET: 3080; 920; 120; 400; 22; 1.84; 3; 20; 10; 1; 12; 200; 27; 25; 2 TABLET ORAL at 07:48

## 2021-09-30 RX ADMIN — DOCUSATE SODIUM 100 MG: 100 CAPSULE, LIQUID FILLED ORAL at 07:47

## 2021-09-30 ASSESSMENT — PAIN SCALES - GENERAL
PAINLEVEL_OUTOF10: 5
PAINLEVEL_OUTOF10: 5

## 2021-09-30 ASSESSMENT — PAIN DESCRIPTION - DESCRIPTORS: DESCRIPTORS: CRAMPING

## 2021-09-30 NOTE — DISCHARGE SUMMARY
Obstetrical Discharge Form        Gestational Age:37w0d    Antepartum complications: placenta previa    Date of Delivery: 9/28/21    Type of Delivery: P C/S       Delivered By:  Dr. Gillian White By:Jeannie Madison}      Baby: male    Anesthesia:  spinal      Intrapartum complications: None    Feeding method: breast    Blood type: A POSITIVE      Rubella:    Rubella Antibody, IGG   Date Value Ref Range Status   03/10/2021 7.9 IU/mL Final     Comment:                 REFERENCE RANGE:  <5.0       NON-REACTIVE (non-immune)  5.0 TO 9.9 EQUIVOCAL  >=10.0     REACTIVE     (immune)             T. Pallidium, IGG:    T. pallidum, IgG   Date Value Ref Range Status   07/03/2021 NONREACTIVE NONREACTIVE Final     Comment:           T. pallidum antibodies are not detected. There is no serological evidence of infection with T. pallidum (early primary syphilis   cannot be excluded). Retest in 2-4 weeks if syphilis is clinically suspect.              Hepatitis B Surface Antigen:   Hepatitis B Surface Ag   Date Value Ref Range Status   03/10/2021 NONREACTIVE NONREACTIVE Final     HIV:  No results found for: [id number]    Results for orders placed or performed during the hospital encounter of 09/28/21   CBC   Result Value Ref Range    WBC 8.8 3.5 - 11.3 k/uL    RBC 3.93 (L) 3.95 - 5.11 m/uL    Hemoglobin 11.4 (L) 11.9 - 15.1 g/dL    Hematocrit 34.5 (L) 36.3 - 47.1 %    MCV 87.8 82.6 - 102.9 fL    MCH 29.0 25.2 - 33.5 pg    MCHC 33.0 28.4 - 34.8 g/dL    RDW 13.0 11.8 - 14.4 %    Platelets 602 149 - 652 k/uL    MPV 9.9 8.1 - 13.5 fL    NRBC Automated 0.0 0.0 per 100 WBC   DRUG SCREEN MULTI URINE   Result Value Ref Range    Amphetamine Screen, Ur NEGATIVE NEGATIVE    Barbiturate Screen, Ur NEGATIVE NEGATIVE    Benzodiazepine Screen, Urine NEGATIVE NEGATIVE    Cocaine Metabolite, Urine NEGATIVE NEGATIVE    Methadone Screen, Urine NEGATIVE NEGATIVE    Opiates, Urine NEGATIVE NEGATIVE    Phencyclidine, Urine NEGATIVE NEGATIVE Propoxyphene, Urine NEGATIVE NEGATIVE    Cannabinoid Scrn, Ur NEGATIVE NEGATIVE    Oxycodone Screen, Ur NEGATIVE NEGATIVE    Methamphetamine, Urine NEGATIVE NEGATIVE    Tricyclic Antidepressants, Urine NEGATIVE NEGATIVE    MDMA, Urine NOT REPORTED NEGATIVE    Buprenorphine Urine NEGATIVE NEGATIVE    Test Information NOT REPORTED    Hemoglobin   Result Value Ref Range    Hemoglobin 9.3 (L) 11.9 - 15.1 g/dL   TYPE AND SCREEN   Result Value Ref Range    Expiration Date 10/01/2021,2359     Arm Band Number [id number]     ABO/Rh A POSITIVE     Antibody Screen NEGATIVE          Postpartum complications: none    Discharge Medication:    Adelina Enter   Home Medication Instructions GDO:[id number]    Printed on:09/30/21 1133   Medication Information                      acetaminophen (TYLENOL) 325 MG tablet  Take 650 mg by mouth every 4 hours as needed for Pain             butalbital-acetaminophen-caffeine (FIORICET, ESGIC) -40 MG per tablet  1-2 tablets every 6 hours prn headache, no more than 6 tablets in 24 hours             FLOVENT HFA 44 MCG/ACT inhaler               loratadine (CLARITIN) 10 MG capsule  Take 10 mg by mouth daily             Prenatal Vit-Fe Fumarate-FA (PRENATAL VITAMIN PO)  Take by mouth             VENTOLIN  (90 Base) MCG/ACT inhaler                      Admit date: 9/28/2021  5:29 AM    Discharge Date: 9/30/2021    Discharged to: Home in stable condition        Plan:   Follow up 1 and 6 weeks postpartum

## 2021-09-30 NOTE — PROGRESS NOTES
Discharge instructions reviewed with patient. Patient verbalized understanding and denies any questions. Discharge papers signed and then given to patient.

## 2021-09-30 NOTE — PLAN OF CARE
Problem: Fluid Volume - Imbalance:  Goal: Absence of postpartum hemorrhage signs and symptoms  Description: Absence of postpartum hemorrhage signs and symptoms  Outcome: Met This Shift  Goal: Absence of imbalanced fluid volume signs and symptoms  Description: Absence of imbalanced fluid volume signs and symptoms  Outcome: Met This Shift     Problem: Infection - Surgical Site:  Goal: Will show no infection signs and symptoms  Description: Will show no infection signs and symptoms  Outcome: Met This Shift     Problem: Mood - Altered:  Goal: Mood stable  Description: Mood stable  Outcome: Met This Shift     Problem: Pain - Acute:  Goal: Pain level will decrease  Description: Pain level will decrease  Outcome: Met This Shift     Problem: Urinary Retention:  Goal: Urinary elimination within specified parameters  Description: Urinary elimination within specified parameters  Outcome: Met This Shift     Problem: Venous Thromboembolism:  Goal: Will show no signs or symptoms of venous thromboembolism  Description: Will show no signs or symptoms of venous thromboembolism  Outcome: Met This Shift  Goal: Absence of signs or symptoms of impaired coagulation  Description: Absence of signs or symptoms of impaired coagulation  Outcome: Met This Shift     Problem: Anxiety:  Goal: Level of anxiety will decrease  Description: Level of anxiety will decrease  9/29/2021 2312 by Dino Martinez RN  Outcome: Completed  9/29/2021 1926 by Wendy Romero RN  Outcome: Ongoing     Problem: Aspiration - Risk of:  Goal: Absence of aspiration  Description: Absence of aspiration  9/29/2021 2312 by Dino Martinez RN  Outcome: Completed  9/29/2021 1926 by Wendy Romero RN  Outcome: Ongoing     Problem: Tissue Perfusion - Uteroplacental, Altered:  Description: [TRUNCATED] For intrapartum patients with recurrent variable decelerations of the fetal heart rate, consider transcervical amnioinfusion. - For patients in labor, avoid prophylactic use of continuous maternal oxygen supplementation to prevent nonreas . Sandra Stands Sandra Stands [TRUNCATED] For intrapartum patients with recurrent variable decelerations of the fetal heart rate, consider transcervical amnioinfusion. - For patients in labor, avoid prophylactic use of continuous maternal oxygen supplementation to prevent nonreas . Sandra Stands Sandra Stands [TRUNCATED] For intrapartum patients with recurrent variable decelerations of the fetal heart rate, consider transcervical amnioinfusion. - For patients in labor, avoid prophylactic use of continuous maternal oxygen supplementation to prevent nonreas . Sandra Stands Sandra Stands [TRUNCATED] For intrapartum patients with recurrent variable decelerations of the fetal heart rate, consider transcervical amnioinfusion. - For patients in labor, avoid prophylactic use of continuous maternal oxygen supplementation to prevent nonreas . Sandra Stands Sandra Stands [TRUNCATED] For intrapartum patients with recurrent variable decelerations of the fetal heart rate, consider transcervical amnioinfusion. - For patients in labor, avoid prophylactic use of continuous maternal oxygen supplementation to prevent nonreas . ..   Goal: Absence of abnormal fetal heart rate pattern  Description: Absence of abnormal fetal heart rate pattern  9/29/2021 2312 by Neno Singh RN  Outcome: Completed  9/29/2021 1926 by Yaritza Velasco RN  Outcome: Ongoing     Problem: Venous Thromboembolism - Risk of:  Goal: Will show no signs or symptoms of venous thromboembolism  Description: Will show no signs or symptoms of venous thromboembolism  9/29/2021 2312 by Neno Singh RN  Outcome: Completed  9/29/2021 1926 by Yaritza Velasco RN  Outcome: Ongoing  9/29/2021 1918 by Yaritza Velasco RN  Outcome: Ongoing     Problem: Discharge Planning:  Goal: Discharged to appropriate level of care  Description: Discharged to appropriate level of care  9/29/2021 2312 by Neno Singh RN  Outcome: Completed  9/29/2021 1926 by Yaritza Velasco RN  Outcome: Ongoing  9/29/2021 1918 by Ayden Castaneda Kayla Rivas RN  Outcome: Ongoing     Problem: Fluid Volume - Imbalance:  Goal: Absence of postpartum hemorrhage signs and symptoms  Description: Absence of postpartum hemorrhage signs and symptoms  9/29/2021 2312 by Jose Carlos Tim RN  Outcome: Completed  9/29/2021 1926 by Haritha Bernardo RN  Outcome: Ongoing  9/29/2021 1918 by Haritha Bernardo RN  Outcome: Ongoing  Goal: Absence of imbalanced fluid volume signs and symptoms  Description: Absence of imbalanced fluid volume signs and symptoms  9/29/2021 2312 by Jose Carlos Tim RN  Outcome: Completed  9/29/2021 1926 by Haritha Bernardo RN  Outcome: Ongoing  9/29/2021 1918 by Haritha Bernardo RN  Outcome: Ongoing     Problem: Infection - Surgical Site:  Goal: Will show no infection signs and symptoms  Description: Will show no infection signs and symptoms  9/29/2021 2312 by Jose Carlos Tim RN  Outcome: Completed  9/29/2021 1926 by Haritha Bernardo RN  Outcome: Ongoing  9/29/2021 1918 by Haritha Bernardo RN  Outcome: Ongoing     Problem: Mood - Altered:  Goal: Mood stable  Description: Mood stable  9/29/2021 2312 by Jose Carlos Tim RN  Outcome: Completed  9/29/2021 1926 by Haritha Bernardo RN  Outcome: Ongoing  9/29/2021 1918 by Haritha Bernardo RN  Outcome: Ongoing     Problem: Nausea/Vomiting:  Goal: Absence of nausea/vomiting  Description: Absence of nausea/vomiting  9/29/2021 2312 by Jose Carlos Tim RN  Outcome: Completed  9/29/2021 1926 by Haritha Bernardo RN  Outcome: Ongoing  9/29/2021 1918 by Haritha Bernardo RN  Outcome: Ongoing     Problem: Pain - Acute:  Goal: Pain level will decrease  Description: Pain level will decrease  9/29/2021 2312 by Jose Carlos Tim RN  Outcome: Completed  9/29/2021 1926 by Haritha Bernardo RN  Outcome: Ongoing  9/29/2021 1918 by Haritha Bernardo RN  Outcome: Ongoing     Problem: Urinary Retention:  Goal: Urinary elimination within specified parameters  Description: Urinary elimination within specified parameters  9/29/2021 2312 by Darien Wolf RN  Outcome: Completed  9/29/2021 1926 by Shahzad Hudson RN  Outcome: Ongoing  9/29/2021 1918 by Shahzad Hudson RN  Outcome: Ongoing     Problem: Venous Thromboembolism:  Goal: Will show no signs or symptoms of venous thromboembolism  Description: Will show no signs or symptoms of venous thromboembolism  9/29/2021 2312 by Darien Wolf RN  Outcome: Completed  9/29/2021 1926 by Shahzad Hudson RN  Outcome: Ongoing  9/29/2021 1918 by Shahzad Hudson RN  Outcome: Ongoing  Goal: Absence of signs or symptoms of impaired coagulation  Description: Absence of signs or symptoms of impaired coagulation  9/29/2021 2312 by Darien Wolf RN  Outcome: Completed  9/29/2021 1926 by Shahzad Hudson RN  Outcome: Ongoing  9/29/2021 1918 by Shahzad Hudson RN  Outcome: Ongoing     Problem: Pain:  Description: Pain management should include both nonpharmacologic and pharmacologic interventions.   Goal: Pain level will decrease  Description: Pain level will decrease  9/29/2021 2312 by Darien Wolf RN  Outcome: Completed  9/29/2021 1926 by Shahzad Hudson RN  Outcome: Ongoing  9/29/2021 1918 by Shahzad Hudson RN  Outcome: Ongoing  Goal: Control of acute pain  Description: Control of acute pain  9/29/2021 2312 by Darien Wolf RN  Outcome: Completed  9/29/2021 1926 by Shahzad Hudson RN  Outcome: Ongoing  9/29/2021 1918 by Shahzad Hudson RN  Outcome: Ongoing  Goal: Control of chronic pain  Description: Control of chronic pain  9/29/2021 2312 by Darien Wolf RN  Outcome: Completed  9/29/2021 1926 by Shahzad Hudson RN  Outcome: Ongoing  9/29/2021 1918 by Shahzad Hudson RN  Outcome: Ongoing

## 2021-09-30 NOTE — PLAN OF CARE
Problem: Anxiety:  Goal: Level of anxiety will decrease  Description: Level of anxiety will decrease  9/29/2021 2312 by Manuel Sheikh RN  Outcome: Completed     Problem: Aspiration - Risk of:  Goal: Absence of aspiration  Description: Absence of aspiration  9/29/2021 2312 by Manuel Sheikh RN  Outcome: Completed     Problem: Tissue Perfusion - Uteroplacental, Altered:  Description: [TRUNCATED] For intrapartum patients with recurrent variable decelerations of the fetal heart rate, consider transcervical amnioinfusion. - For patients in labor, avoid prophylactic use of continuous maternal oxygen supplementation to prevent nonreas . Jesus Conroe Jesus Conroe [TRUNCATED] For intrapartum patients with recurrent variable decelerations of the fetal heart rate, consider transcervical amnioinfusion. - For patients in labor, avoid prophylactic use of continuous maternal oxygen supplementation to prevent nonreas . Jesus Conroe Jesus Conroe [TRUNCATED] For intrapartum patients with recurrent variable decelerations of the fetal heart rate, consider transcervical amnioinfusion. - For patients in labor, avoid prophylactic use of continuous maternal oxygen supplementation to prevent nonreas . Jesus Conroe Jesus Conroe [TRUNCATED] For intrapartum patients with recurrent variable decelerations of the fetal heart rate, consider transcervical amnioinfusion. - For patients in labor, avoid prophylactic use of continuous maternal oxygen supplementation to prevent nonreas . Jesus Conroe Jesus Conroe [TRUNCATED] For intrapartum patients with recurrent variable decelerations of the fetal heart rate, consider transcervical amnioinfusion. - For patients in labor, avoid prophylactic use of continuous maternal oxygen supplementation to prevent nonreas . ..   Goal: Absence of abnormal fetal heart rate pattern  Description: Absence of abnormal fetal heart rate pattern  9/29/2021 2312 by Manuel Sheikh RN  Outcome: Completed     Problem: Venous Thromboembolism - Risk of:  Goal: Will show no signs or symptoms of venous thromboembolism  Description: Will show no signs or symptoms of venous thromboembolism  9/29/2021 2312 by Merlyn Amato RN  Outcome: Completed     Problem: Discharge Planning:  Goal: Discharged to appropriate level of care  Description: Discharged to appropriate level of care  9/29/2021 2312 by Merlyn Amato RN  Outcome: Completed     Problem: Fluid Volume - Imbalance:  Goal: Absence of postpartum hemorrhage signs and symptoms  Description: Absence of postpartum hemorrhage signs and symptoms  9/29/2021 2312 by Merlyn Amato RN  Outcome: Completed  Goal: Absence of imbalanced fluid volume signs and symptoms  Description: Absence of imbalanced fluid volume signs and symptoms  9/29/2021 2312 by Merlyn Amato RN  Outcome: Completed  Goal: Absence of postpartum hemorrhage signs and symptoms  Description: Absence of postpartum hemorrhage signs and symptoms  9/30/2021 1246 by Ibeth Haji RN  Outcome: Met This Shift  9/29/2021 2312 by Merlyn Amato RN  Outcome: Met This Shift  Goal: Absence of imbalanced fluid volume signs and symptoms  Description: Absence of imbalanced fluid volume signs and symptoms  9/30/2021 1246 by Ibeth Haji RN  Outcome: Met This Shift  9/29/2021 2312 by Merlyn Amato RN  Outcome: Met This Shift     Problem: Infection - Surgical Site:  Goal: Will show no infection signs and symptoms  Description: Will show no infection signs and symptoms  9/29/2021 2312 by Merlyn Amato RN  Outcome: Completed  Goal: Will show no infection signs and symptoms  Description: Will show no infection signs and symptoms  9/30/2021 1246 by Ibeth Haji RN  Outcome: Met This Shift  9/29/2021 2312 by Merlyn Amato RN  Outcome: Met This Shift     Problem: Mood - Altered:  Goal: Mood stable  Description: Mood stable  9/29/2021 2312 by Merlyn Amato RN  Outcome: Completed  Goal: Mood stable  Description: Mood stable  9/30/2021 1246 by Ibeth Haji RN  Outcome: Met This Shift  9/29/2021 2312 by Manuel Ruth RN  Outcome: Met This Shift     Problem: Pain:  Description: Pain management should include both nonpharmacologic and pharmacologic interventions.   Goal: Pain level will decrease  Description: Pain level will decrease  9/29/2021 2312 by Manuel Ruth RN  Outcome: Completed  Goal: Control of acute pain  Description: Control of acute pain  9/29/2021 2312 by Manuel Ruth RN  Outcome: Completed  Goal: Control of chronic pain  Description: Control of chronic pain  9/29/2021 2312 by Manuel Ruth RN  Outcome: Completed

## 2021-09-30 NOTE — PROGRESS NOTES
C/S  Labor and Delivery       Post Partum Progress Note           SUBJECTIVE:  2nd day postop , denies c/o, desires discharge today  Flatus:Present  BM:no  Diet: Tolerating regular diet   Ambulation: Ambulateswithout difficulty    OBJECTIVE:      Vitals:  BP (!) 92/56   Pulse 75   Temp (!) 60.8 °F (16 °C)   Resp 16   Ht 5' 7\" (1.702 m)   Wt 167 lb (75.8 kg)   LMP 2021 (Approximate)   Breastfeeding Unknown   BMI 26.16 kg/m²   Patient Vitals for the past 24 hrs:   BP Temp Temp src Pulse Resp   21 0750 (!) 92/56 (!) 60.8 °F (16 °C) -- 75 16   21 2328 (!) 108/57 98 °F (36.7 °C) -- 85 14   21 1959 106/71 97.7 °F (36.5 °C) -- 79 16   21 1623 104/64 98.3 °F (36.8 °C) Oral 75 16       ABDOMEN:  No scars, normal bowel sounds, soft, non-distended, non-tender, no masses palpated, no hepatosplenomegally  INCISION: dressing in place, clean, dry and intact  GENITAL/URINARY:  External Genitalia:  General appearance; normal, Hair distribution; normal, Lesions absent  Cor: RRR no Murmurs  Pulmonary: clear to auscultation anterior and posterior  Extremities: no Clubbing cyanosis or ecchymosis    DATA:    CBC:   Lab Results   Component Value Date    WBC 8.8 2021    RBC 3.93 2021    HGB 9.3 2021    HCT 34.5 2021    MCV 87.8 2021    MCH 29.0 2021    MCHC 33.0 2021    RDW 13.0 2021     2021    MPV 9.9 2021       ASSESSMENT:      Active Problems:    Placenta previa antepartum, third trimester  Plan: delivered    39 weeks gestation of pregnancy  Plan: delivered     delivery delivered         S/P C/S post op day # 2     PLAN:  D/c home, rto 1 and 6 weeks postpartum, routine instructions

## 2021-10-02 ENCOUNTER — HOSPITAL ENCOUNTER (EMERGENCY)
Age: 21
Discharge: HOME | End: 2021-10-03
Attending: EMERGENCY MEDICINE
Payer: COMMERCIAL

## 2021-10-02 VITALS
SYSTOLIC BLOOD PRESSURE: 113 MMHG | HEART RATE: 105 BPM | OXYGEN SATURATION: 98 % | RESPIRATION RATE: 16 BRPM | TEMPERATURE: 98.5 F | DIASTOLIC BLOOD PRESSURE: 83 MMHG

## 2021-10-02 DIAGNOSIS — N30.00 ACUTE CYSTITIS WITHOUT HEMATURIA: Primary | ICD-10-CM

## 2021-10-02 PROCEDURE — 96365 THER/PROPH/DIAG IV INF INIT: CPT

## 2021-10-02 PROCEDURE — 99282 EMERGENCY DEPT VISIT SF MDM: CPT

## 2021-10-02 PROCEDURE — 87635 SARS-COV-2 COVID-19 AMP PRB: CPT

## 2021-10-02 RX ORDER — 0.9 % SODIUM CHLORIDE 0.9 %
1000 INTRAVENOUS SOLUTION INTRAVENOUS ONCE
Status: COMPLETED | OUTPATIENT
Start: 2021-10-03 | End: 2021-10-03

## 2021-10-02 ASSESSMENT — PAIN SCALES - GENERAL: PAINLEVEL_OUTOF10: 6

## 2021-10-02 ASSESSMENT — PAIN DESCRIPTION - LOCATION: LOCATION: HEAD

## 2021-10-03 ENCOUNTER — APPOINTMENT (OUTPATIENT)
Dept: CT IMAGING | Age: 21
End: 2021-10-03
Payer: COMMERCIAL

## 2021-10-03 ENCOUNTER — APPOINTMENT (OUTPATIENT)
Dept: GENERAL RADIOLOGY | Age: 21
End: 2021-10-03
Payer: COMMERCIAL

## 2021-10-03 LAB
-: ABNORMAL
ABSOLUTE EOS #: 0.45 K/UL (ref 0–0.44)
ABSOLUTE IMMATURE GRANULOCYTE: 0.05 K/UL (ref 0–0.3)
ABSOLUTE LYMPH #: 1.86 K/UL (ref 1.1–3.7)
ABSOLUTE MONO #: 0.81 K/UL (ref 0.1–1.2)
ALBUMIN SERPL-MCNC: 3.6 G/DL (ref 3.5–5.2)
ALBUMIN/GLOBULIN RATIO: 1.2 (ref 1–2.5)
ALP BLD-CCNC: 103 U/L (ref 35–104)
ALT SERPL-CCNC: 16 U/L (ref 5–33)
AMORPHOUS: ABNORMAL
ANION GAP SERPL CALCULATED.3IONS-SCNC: 13 MMOL/L (ref 9–17)
AST SERPL-CCNC: 17 U/L
BACTERIA: ABNORMAL
BASOPHILS # BLD: 1 % (ref 0–2)
BASOPHILS ABSOLUTE: 0.06 K/UL (ref 0–0.2)
BILIRUB SERPL-MCNC: 0.46 MG/DL (ref 0.3–1.2)
BILIRUBIN URINE: NEGATIVE
BUN BLDV-MCNC: 10 MG/DL (ref 6–20)
BUN/CREAT BLD: 19 (ref 9–20)
CALCIUM SERPL-MCNC: 8.9 MG/DL (ref 8.6–10.4)
CASTS UA: ABNORMAL /LPF
CHLORIDE BLD-SCNC: 107 MMOL/L (ref 98–107)
CO2: 21 MMOL/L (ref 20–31)
COLOR: YELLOW
COMMENT UA: ABNORMAL
CREAT SERPL-MCNC: 0.52 MG/DL (ref 0.5–0.9)
CRYSTALS, UA: ABNORMAL /HPF
DIFFERENTIAL TYPE: ABNORMAL
EOSINOPHILS RELATIVE PERCENT: 4 % (ref 1–4)
EPITHELIAL CELLS UA: ABNORMAL /HPF (ref 0–25)
GFR AFRICAN AMERICAN: >60 ML/MIN
GFR NON-AFRICAN AMERICAN: >60 ML/MIN
GFR SERPL CREATININE-BSD FRML MDRD: ABNORMAL ML/MIN/{1.73_M2}
GFR SERPL CREATININE-BSD FRML MDRD: ABNORMAL ML/MIN/{1.73_M2}
GLUCOSE BLD-MCNC: 89 MG/DL (ref 70–99)
GLUCOSE URINE: NEGATIVE
HCT VFR BLD CALC: 32.2 % (ref 36.3–47.1)
HEMOGLOBIN: 9.9 G/DL (ref 11.9–15.1)
IMMATURE GRANULOCYTES: 0 %
KETONES, URINE: NEGATIVE
LEUKOCYTE ESTERASE, URINE: ABNORMAL
LYMPHOCYTES # BLD: 17 % (ref 24–43)
MAGNESIUM: 1.6 MG/DL (ref 1.6–2.6)
MCH RBC QN AUTO: 28.4 PG (ref 25.2–33.5)
MCHC RBC AUTO-ENTMCNC: 30.7 G/DL (ref 28.4–34.8)
MCV RBC AUTO: 92.5 FL (ref 82.6–102.9)
MONOCYTES # BLD: 7 % (ref 3–12)
MUCUS: ABNORMAL
NITRITE, URINE: POSITIVE
NRBC AUTOMATED: 0 PER 100 WBC
OTHER OBSERVATIONS UA: ABNORMAL
PDW BLD-RTO: 13.1 % (ref 11.8–14.4)
PH UA: 6 (ref 5–9)
PLATELET # BLD: 219 K/UL (ref 138–453)
PLATELET ESTIMATE: ABNORMAL
PMV BLD AUTO: 10 FL (ref 8.1–13.5)
POTASSIUM SERPL-SCNC: 3.3 MMOL/L (ref 3.7–5.3)
PROTEIN UA: NEGATIVE
RBC # BLD: 3.48 M/UL (ref 3.95–5.11)
RBC # BLD: ABNORMAL 10*6/UL
RBC UA: ABNORMAL /HPF (ref 0–2)
RENAL EPITHELIAL, UA: ABNORMAL /HPF
SARS-COV-2, RAPID: NOT DETECTED
SEG NEUTROPHILS: 71 % (ref 36–65)
SEGMENTED NEUTROPHILS ABSOLUTE COUNT: 7.9 K/UL (ref 1.5–8.1)
SODIUM BLD-SCNC: 141 MMOL/L (ref 135–144)
SPECIFIC GRAVITY UA: <1.005 (ref 1.01–1.02)
SPECIMEN DESCRIPTION: NORMAL
TOTAL PROTEIN: 6.5 G/DL (ref 6.4–8.3)
TRICHOMONAS: ABNORMAL
TROPONIN INTERP: NORMAL
TROPONIN T: NORMAL NG/ML
TROPONIN, HIGH SENSITIVITY: <6 NG/L (ref 0–14)
TURBIDITY: CLEAR
URINE HGB: ABNORMAL
UROBILINOGEN, URINE: NORMAL
WBC # BLD: 11.1 K/UL (ref 3.5–11.3)
WBC # BLD: ABNORMAL 10*3/UL
WBC UA: ABNORMAL /HPF (ref 0–5)
YEAST: ABNORMAL

## 2021-10-03 PROCEDURE — 36415 COLL VENOUS BLD VENIPUNCTURE: CPT

## 2021-10-03 PROCEDURE — 85025 COMPLETE CBC W/AUTO DIFF WBC: CPT

## 2021-10-03 PROCEDURE — 6360000002 HC RX W HCPCS: Performed by: EMERGENCY MEDICINE

## 2021-10-03 PROCEDURE — 83735 ASSAY OF MAGNESIUM: CPT

## 2021-10-03 PROCEDURE — 80053 COMPREHEN METABOLIC PANEL: CPT

## 2021-10-03 PROCEDURE — 87040 BLOOD CULTURE FOR BACTERIA: CPT

## 2021-10-03 PROCEDURE — 74177 CT ABD & PELVIS W/CONTRAST: CPT

## 2021-10-03 PROCEDURE — 2580000003 HC RX 258: Performed by: EMERGENCY MEDICINE

## 2021-10-03 PROCEDURE — 6360000004 HC RX CONTRAST MEDICATION: Performed by: EMERGENCY MEDICINE

## 2021-10-03 PROCEDURE — 84484 ASSAY OF TROPONIN QUANT: CPT

## 2021-10-03 PROCEDURE — 71045 X-RAY EXAM CHEST 1 VIEW: CPT

## 2021-10-03 PROCEDURE — 81001 URINALYSIS AUTO W/SCOPE: CPT

## 2021-10-03 RX ORDER — CEPHALEXIN 500 MG/1
500 CAPSULE ORAL 3 TIMES DAILY
Qty: 21 CAPSULE | Refills: 0 | Status: SHIPPED | OUTPATIENT
Start: 2021-10-03 | End: 2021-10-10

## 2021-10-03 RX ADMIN — IOPAMIDOL 75 ML: 755 INJECTION, SOLUTION INTRAVENOUS at 01:42

## 2021-10-03 RX ADMIN — SODIUM CHLORIDE 1000 ML: 9 INJECTION, SOLUTION INTRAVENOUS at 02:11

## 2021-10-03 RX ADMIN — CEFTRIAXONE 1000 MG: 1 INJECTION, POWDER, FOR SOLUTION INTRAMUSCULAR; INTRAVENOUS at 03:16

## 2021-10-03 NOTE — ED PROVIDER NOTES
[facility] ED  [facility]   Chief Complaint   Patient presents with    Fever     patient had a c section on  and is breast feeding.  Headache      HPI   Ar Quick is a 23 y.o. female who presents with a fever of 100.5. She had a  approximately 5 days ago. She is also got some headache some coughing and some lower abdominal discomfort on the left and general body aches. No other current complaints. There are no alleviating factors. She has not had anything like this before. REVIEW OF SYSTEMS   GI: Patient complains of abdominal pain  Cardiac: No chest pain or syncope  Pulmonary: No difficulty breathing or new cough  General: + fevers  : No hematuria or dysuria  See HPI for further details. All other review of systems otherwise negative. PAST MEDICAL & SURGICAL HISTORY   Past Medical History:   Diagnosis Date    Asthma     rescue inhaler and daily use of flow vent inhaler    Asthma     Complication of anesthesia     Nausea    Mental disorder     anxiety    Mental disorder      Past Surgical History:   Procedure Laterality Date    ABDOMINAL EXPLORATION SURGERY  09/15/2019    RT Oophorectomy     SECTION N/A 2021     SECTION performed by Otilia Jon DO at [facility] AT THE [facility] L&D OR    LAPAROSCOPY Left 9/15/2019    EXPLORATORY LAPAROTOMY, RIGHT OOPHRECTOMY performed by Carolyn Turner MD at [address] [place] [address]   Current Outpatient Rx   Medication Sig Dispense Refill    cephALEXin (KEFLEX) 500 MG capsule Take 1 capsule by mouth 3 times daily for 7 days 21 capsule 0    oxyCODONE-acetaminophen (PERCOCET) 5-325 MG per tablet Take 1 tablet by mouth every 6 hours as needed for Pain for up to 7 days.  28 tablet 0    ibuprofen (ADVIL;MOTRIN) 800 MG tablet Take 1 tablet by mouth every 8 hours as needed for Pain 30 tablet 1    butalbital-acetaminophen-caffeine (FIORICET, ESGIC) -40 MG per tablet 1-2 tablets every 6 hours prn headache, no more than 6 tablets in 24 hours 30 tablet 1    FLOVENT HFA 44 MCG/ACT inhaler       VENTOLIN  (90 Base) MCG/ACT inhaler  (Patient not taking: Reported on 9/21/2021)      Prenatal Vit-Fe Fumarate-FA (PRENATAL VITAMIN PO) Take by mouth      loratadine (CLARITIN) 10 MG capsule Take 10 mg by mouth daily      acetaminophen (TYLENOL) 325 MG tablet Take 650 mg by mouth every 4 hours as needed for Pain (Patient not taking: Reported on 9/21/2021)        ALLERGIES   No Known Allergies   SOCIAL AND FAMILY HISTORY   Social History     Socioeconomic History    Marital status: Single     Spouse name: None    Number of children: None    Years of education: None    Highest education level: None   Occupational History    None   Tobacco Use    Smoking status: Never Smoker    Smokeless tobacco: Never Used   Vaping Use    Vaping Use: Never used   Substance and Sexual Activity    Alcohol use: Not Currently    Drug use: Not Currently    Sexual activity: Not Currently     Partners: Male   Other Topics Concern    None   Social History Narrative    None     Social Determinants of Health     Financial Resource Strain:     Difficulty of Paying Living Expenses:    Food Insecurity:     Worried About Running Out of Food in the Last Year:     Ran Out of Food in the Last Year:    Transportation Needs:     Lack of Transportation (Medical):      Lack of Transportation (Non-Medical):    Physical Activity:     Days of Exercise per Week:     Minutes of Exercise per Session:    Stress:     Feeling of Stress :    Social Connections:     Frequency of Communication with Friends and Family:     Frequency of Social Gatherings with Friends and Family:     Attends Gnosticism Services:     Active Member of Clubs or Organizations:     Attends Club or Organization Meetings:     Marital Status:    Intimate Partner Violence:     Fear of Current or Ex-Partner:  Emotionally Abused:     Physically Abused:     Sexually Abused:      Family History   Problem Relation Age of Onset    Heart Defect Maternal Grandfather     Asthma Father     No Known Problems Mother     Heart Defect Sister     Other Other         No family hx stroke or ovarian cancer    Breast Cancer Maternal Great Grandmother         PHYSICAL EXAM   VITAL SIGNS: /83   Pulse 105   Temp 98.5 °F (36.9 °C) (Tympanic)   Resp 16   LMP 2021 (Approximate)   SpO2 98%   Constitutional: Well developed, well nourished  Eyes: Sclera nonicteric, conjunctiva moist  HENT: Atraumatic, nose normal  Neck: Supple, no JVD  Respiratory: No retractions, normal breath sounds   Cardiovascular: Normal rate, normal rhythm, no murmurs  GI: Soft, mild left lower central abdominal tenderness,  Musculoskeletal: No edema, no deformity   Vascular: radial pulses 2+ equal bilaterally  Integument: No rash, dry skin, surgical wound not tender, I left dressing intact  Neurologic: Alert & oriented, normal speech  Psychiatric: Cooperative, pleasant affect     RADIOLOGY/PROCEDURES   CT ABDOMEN PELVIS W IV CONTRAST Additional Contrast? None   Final Result   1. Recent  section postoperative findings without evidence of   complication. 2. No evidence of infectious process. 3. Unremarkable postpartum uterus. 4. Bilateral renal collecting system scattered punctate calculi without   associated urinary obstruction. XR CHEST PORTABLE   Final Result   Normal portable chest examination. ED COURSE & MEDICAL DECISION MAKING   Pertinent Labs & Imaging studies reviewed and interpreted. (See chart for details)   See EMR for medications prescribed  Vitals:    10/02/21 2338   BP: 113/83   Pulse: 105   Resp: 16   Temp: 98.5 °F (36.9 °C)   SpO2: 98%     Differential diagnosis: Abdominal Aortic Aneurysm, Ischemic Bowel, Bowel Obstruction, Acute Cholecystitis, Acute Appendicitis, cystitis, viral syndrome.     MDM: Patient noted to have UTI, probably related to recent surgery and Alarcon placement. Other work-up was negative. She will be on Keflex after 1 dose ceftriaxone for that. She will follow up with her doctor. FINAL IMPRESSION   1.  Acute cystitis without hematuria        PLAN  Home with follow up  Electronically signed by: Dia Jalloh MD, 10/3/2021 3:05 AM  (This note was completed with a voice recognition program)            Dia Jalloh MD  10/03/21 1458

## 2021-10-05 ENCOUNTER — POSTPARTUM VISIT (OUTPATIENT)
Dept: OBGYN | Age: 21
End: 2021-10-05
Payer: COMMERCIAL

## 2021-10-05 VITALS — BODY MASS INDEX: 25.53 KG/M2 | WEIGHT: 163 LBS | SYSTOLIC BLOOD PRESSURE: 112 MMHG | DIASTOLIC BLOOD PRESSURE: 78 MMHG

## 2021-10-05 DIAGNOSIS — Z09 POSTOP CHECK: Primary | ICD-10-CM

## 2021-10-05 PROCEDURE — 99024 POSTOP FOLLOW-UP VISIT: CPT | Performed by: OBSTETRICS & GYNECOLOGY

## 2021-10-05 NOTE — PROGRESS NOTES
Here for PP incision check. C/O moderate discomfort and takes Tylenol for pain. Abdominal dressing removed without difficulty. Area clean, dry and intact. 12 steri strips removed. Pt tolerated well. No redness, drainage or swelling noted. Instructions given to pt on care of area and S/s of infection. States understanding. Will schedule PPV today.

## 2021-10-08 LAB
CULTURE: NORMAL
Lab: NORMAL
SPECIMEN DESCRIPTION: NORMAL

## 2021-11-09 ENCOUNTER — POSTPARTUM VISIT (OUTPATIENT)
Dept: OBGYN | Age: 21
End: 2021-11-09
Payer: COMMERCIAL

## 2021-11-09 VITALS
WEIGHT: 155 LBS | HEIGHT: 67 IN | DIASTOLIC BLOOD PRESSURE: 64 MMHG | BODY MASS INDEX: 24.33 KG/M2 | SYSTOLIC BLOOD PRESSURE: 106 MMHG

## 2021-11-09 PROCEDURE — 0503F POSTPARTUM CARE VISIT: CPT | Performed by: OBSTETRICS & GYNECOLOGY

## 2021-11-09 NOTE — PROGRESS NOTES
POSTPARTUM EXAM    Date of service: 2021    Linda Enriquez  Is a 23 y.o. single female    PT's PCP is: Grey Hammond DO     : 2000     OB History    Para Term  AB Living   1 1 1     1   SAB IAB Ectopic Molar Multiple Live Births           0 1      # Outcome Date GA Lbr Kyle/2nd Weight Sex Delivery Anes PTL Lv   1 Term 21 37w0d  6 lb 6.5 oz (2.907 kg) M CS-LTranv Spinal  BETHEL        Social History     Tobacco Use   Smoking Status Never Smoker   Smokeless Tobacco Never Used         Social History     Substance and Sexual Activity   Alcohol Use Not Currently         Delivery date 21     Type of delivery:  Primary  section    Laceration:    Infant gender: male    Infant name: Sanket Holder    Are you breast or bottle feeding? Breast    Have you been sexually active since delivery? No    Vital Signs: Blood pressure 106/64, height 5' 7\" (1.702 m), weight 155 lb (70.3 kg), last menstrual period 2021, currently breastfeeding. Labs:    Blood Type and Rh: A POSITIVE          Allergies: Patient has no known allergies.       Current Outpatient Medications:     FLOVENT HFA 44 MCG/ACT inhaler, , Disp: , Rfl:     VENTOLIN  (90 Base) MCG/ACT inhaler, , Disp: , Rfl:     Prenatal Vit-Fe Fumarate-FA (PRENATAL VITAMIN PO), Take by mouth, Disp: , Rfl:     loratadine (CLARITIN) 10 MG capsule, Take 10 mg by mouth daily, Disp: , Rfl:     ibuprofen (ADVIL;MOTRIN) 800 MG tablet, Take 1 tablet by mouth every 8 hours as needed for Pain (Patient not taking: Reported on 10/5/2021), Disp: 30 tablet, Rfl: 1    butalbital-acetaminophen-caffeine (FIORICET, ESGIC) -40 MG per tablet, 1-2 tablets every 6 hours prn headache, no more than 6 tablets in 24 hours (Patient not taking: Reported on 2021), Disp: 30 tablet, Rfl: 1    acetaminophen (TYLENOL) 325 MG tablet, Take 650 mg by mouth every 4 hours as needed for Pain  (Patient not taking: Reported on 2021), Disp: , Rfl: Last Yearly:  04/2021    Last pap: 04/2021    Last HPV: 2021    Chief Complaint   Patient presents with    Postpartum Care     Patient is being seen for 6 week PP visit. P C/S, male 9/28/21. Patient notes off and on light bleeding. How many Hours of sleep do you get a night: 5-7    Do you have a normal appetite: yes    Any problems or pain: cramps    Do you feel like you coping well: most of the time    Is baby sleeping:yes    How is baby eating: good    How did first pediatrician visit go: good    EPPDS:   Postpartum Depression Scale 11/9/2021   I have been able to laugh and see the funny side of things As much as I always could   I have looked forward with enjoyment to things As much as I ever did   I have blamed myself unnecessarily when things went wrong Yes, some of the time   I have been anxious or worried for no good reason No, not at all   I have felt scared or panicky for no good reason No, not at all   I haven't been able to cope lately No, most of the time I have coped quite well   I have been so unhappy that I have had difficulty sleeping Not at all   I have felt sad or miserable Not very often   I have been so unhappy that I have been crying Only occasionally   The thought of harming myself has occurred to me Never   Total 5         No results found for this visit on 11/09/21. Nurse: Bijal GARCIA      HPI:  PT presents for Post partum exam Follow up in 6 week(s) after delivery. Still having some spotting. Pt aware that if it lasts past 8 wks pp that she should call for usn. Objective  Physical Exam  Constitutional:       Appearance: Normal appearance. HENT:      Head: Normocephalic and atraumatic. Eyes:      Extraocular Movements: Extraocular movements intact. Pupils: Pupils are equal, round, and reactive to light. Cardiovascular:      Rate and Rhythm: Normal rate.    Pulmonary:      Effort: Pulmonary effort is normal.   Musculoskeletal:         General: Normal range of motion. Neurological:      Mental Status: She is alert and oriented to person, place, and time. Skin:     General: Skin is warm and dry. Psychiatric:         Mood and Affect: Mood normal.         Behavior: Behavior normal.         Thought Content: Thought content normal.         Judgment: Judgment normal.                                 Assessment and Plan          Diagnosis Orders   1. Postpartum care and examination               I am having Maritza Moscoso maintain her acetaminophen, Ventolin HFA, Prenatal Vit-Fe Fumarate-FA (PRENATAL VITAMIN PO), loratadine, Flovent HFA, butalbital-acetaminophen-caffeine, and ibuprofen. Return in about 5 months (around 4/9/2022) for annual.    She was also counseled on her preventative health maintenance recommendations and follow-up. There are no Patient Instructions on file for this visit.     Carmen Hurd DO,11/9/2021 8:46 AM

## 2022-01-10 ENCOUNTER — HOSPITAL ENCOUNTER (OUTPATIENT)
Age: 22
Setting detail: SPECIMEN
Discharge: HOME | End: 2022-01-10
Payer: COMMERCIAL

## 2022-01-10 ENCOUNTER — OFFICE VISIT (OUTPATIENT)
Dept: OBGYN | Age: 22
End: 2022-01-10
Payer: COMMERCIAL

## 2022-01-10 VITALS
SYSTOLIC BLOOD PRESSURE: 116 MMHG | WEIGHT: 161.8 LBS | DIASTOLIC BLOOD PRESSURE: 72 MMHG | HEIGHT: 67 IN | BODY MASS INDEX: 25.39 KG/M2

## 2022-01-10 DIAGNOSIS — R10.2 PELVIC PAIN: Primary | ICD-10-CM

## 2022-01-10 DIAGNOSIS — R10.2 PELVIC PAIN: ICD-10-CM

## 2022-01-10 PROCEDURE — 87660 TRICHOMONAS VAGIN DIR PROBE: CPT

## 2022-01-10 PROCEDURE — 87480 CANDIDA DNA DIR PROBE: CPT

## 2022-01-10 PROCEDURE — 99213 OFFICE O/P EST LOW 20 MIN: CPT | Performed by: OBSTETRICS & GYNECOLOGY

## 2022-01-10 PROCEDURE — 87510 GARDNER VAG DNA DIR PROBE: CPT

## 2022-01-10 NOTE — PROGRESS NOTES
DATE OF VISIT:  1/10/22    PATIENT NAME:  Schuyler Ortega     YOB: 2000    REASON FOR VISIT:    Chief Complaint   Patient presents with    Pelvic Pain     Patient notes some pelvic/abd pain that has been occuring for the last 3 weeks. She was recently treated for UTI and yeast but notes that she still has the random pains. She is still currently breastfeeding and notes 2 cycles since her sons birth. HISTORY OF PRESENT ILLNESS:  Pt with random intermittent pelvic pain; states that it isn't always in the same area; has had no new partners; feels light headed at times with the pain; denies gastrointestinal symptoms; states that she has no dysuria - did have uti and yeast infection      Patient's last menstrual period was 12/31/2021. Vitals:    01/10/22 1623   BP: 116/72   Weight: 161 lb 12.8 oz (73.4 kg)   Height: 5' 7\" (1.702 m)     Body mass index is 25.34 kg/m². No Known Allergies  Current Outpatient Medications   Medication Sig Dispense Refill    butalbital-acetaminophen-caffeine (FIORICET, ESGIC) -40 MG per tablet 1-2 tablets every 6 hours prn headache, no more than 6 tablets in 24 hours 30 tablet 1    FLOVENT HFA 44 MCG/ACT inhaler       VENTOLIN  (90 Base) MCG/ACT inhaler       Prenatal Vit-Fe Fumarate-FA (PRENATAL VITAMIN PO) Take by mouth      loratadine (CLARITIN) 10 MG capsule Take 10 mg by mouth daily      ibuprofen (ADVIL;MOTRIN) 800 MG tablet Take 1 tablet by mouth every 8 hours as needed for Pain (Patient not taking: Reported on 10/5/2021) 30 tablet 1    acetaminophen (TYLENOL) 325 MG tablet Take 650 mg by mouth every 4 hours as needed for Pain  (Patient not taking: Reported on 11/9/2021)       No current facility-administered medications for this visit.      Social History     Socioeconomic History    Marital status: Single     Spouse name: None    Number of children: None    Years of education: None    Highest education level: None   Occupational History    None   Tobacco Use    Smoking status: Never Smoker    Smokeless tobacco: Never Used   Vaping Use    Vaping Use: Never used   Substance and Sexual Activity    Alcohol use: Not Currently    Drug use: Not Currently    Sexual activity: Not Currently     Partners: Male   Other Topics Concern    None   Social History Narrative    None     Social Determinants of Health     Financial Resource Strain:     Difficulty of Paying Living Expenses: Not on file   Food Insecurity:     Worried About Running Out of Food in the Last Year: Not on file    Patti of Food in the Last Year: Not on file   Transportation Needs:     Lack of Transportation (Medical): Not on file    Lack of Transportation (Non-Medical): Not on file   Physical Activity:     Days of Exercise per Week: Not on file    Minutes of Exercise per Session: Not on file   Stress:     Feeling of Stress : Not on file   Social Connections:     Frequency of Communication with Friends and Family: Not on file    Frequency of Social Gatherings with Friends and Family: Not on file    Attends [employer] Services: Not on file    Active Member of [address] [place] [address] or Organizations: Not on file    Attends Club or Organization Meetings: Not on file    Marital Status: Not on file   Intimate Partner Violence:     Fear of Current or Ex-Partner: Not on file    Emotionally Abused: Not on file    Physically Abused: Not on file    Sexually Abused: Not on file   Housing Stability:     Unable to Pay for Housing in the Last Year: Not on file    Number of [place] in the Last Year: Not on file    Unstable Housing in the Last Year: Not on file       REVIEW OF SYSTEMS:  Review of Systems   Constitutional: Negative for chills and fever. Genitourinary: Positive for pelvic pain. Negative for dysuria, urgency and vaginal discharge.        PHYSICAL EXAM:  /72   Ht 5' 7\" (1.702 m)   Wt 161 lb 12.8 oz (73.4 kg)   LMP 12/31/2021   Breastfeeding Yes   BMI 25.34 kg/m²   Physical Exam  Constitutional:       Appearance: Normal appearance. HENT:      Head: Normocephalic and atraumatic. Eyes:      Extraocular Movements: Extraocular movements intact. Pupils: Pupils are equal, round, and reactive to light. Cardiovascular:      Rate and Rhythm: Normal rate. Pulmonary:      Effort: Pulmonary effort is normal.   Musculoskeletal:         General: Normal range of motion. Neurological:      Mental Status: She is alert and oriented to person, place, and time. Skin:     General: Skin is warm and dry. Psychiatric:         Mood and Affect: Mood normal.         Behavior: Behavior normal.         Thought Content: Thought content normal.         Judgment: Judgment normal.       The patient, Ximena Shi is a 19 y.o. female, was seen with a total time spent of 20 minutes for the visit on this date of service by the E/M provider. The time component had both face to face and non face to face time spent in determining the total time component. Counseling and education regarding her diagnosis listed below and her options regarding those diagnoses were also included in determining her time component. Diagnosis Orders   1. Pelvic pain  VAGINITIS DNA PROBE        The patient had her preventative health maintenance recommendations and follow-up reviewed with her at the completion of her visit.     ASSESSMENT:      1. Pelvic pain        PLAN:  Orders Placed This Encounter   Procedures    VAGINITIS DNA PROBE     Return if symptoms worsen or fail to improve, for annual.       Electronically signed by Sandhya Cisneros DO on 01/10/22

## 2022-01-11 LAB
CANDIDA SPECIES, DNA PROBE: NEGATIVE
GARDNERELLA VAGINALIS, DNA PROBE: NEGATIVE
SOURCE: NORMAL
TRICHOMONAS VAGINALIS DNA: NEGATIVE

## 2022-04-11 ENCOUNTER — HOSPITAL ENCOUNTER (OUTPATIENT)
Age: 22
Setting detail: SPECIMEN
Discharge: HOME | End: 2022-04-11
Payer: COMMERCIAL

## 2022-04-11 ENCOUNTER — OFFICE VISIT (OUTPATIENT)
Dept: OBGYN | Age: 22
End: 2022-04-11
Payer: COMMERCIAL

## 2022-04-11 VITALS
WEIGHT: 164 LBS | DIASTOLIC BLOOD PRESSURE: 78 MMHG | HEIGHT: 67 IN | BODY MASS INDEX: 25.74 KG/M2 | SYSTOLIC BLOOD PRESSURE: 116 MMHG

## 2022-04-11 DIAGNOSIS — R30.0 DYSURIA: ICD-10-CM

## 2022-04-11 DIAGNOSIS — Z01.419 WOMEN'S ANNUAL ROUTINE GYNECOLOGICAL EXAMINATION: Primary | ICD-10-CM

## 2022-04-11 DIAGNOSIS — R31.9 HEMATURIA, UNSPECIFIED TYPE: ICD-10-CM

## 2022-04-11 LAB
-: NORMAL
BILIRUBIN URINE: NEGATIVE
COLOR: YELLOW
EPITHELIAL CELLS UA: NORMAL /HPF (ref 0–25)
GLUCOSE URINE: NEGATIVE
KETONES, URINE: NEGATIVE
LEUKOCYTE ESTERASE, URINE: NEGATIVE
NITRITE, URINE: NEGATIVE
PH UA: 7 (ref 5–9)
PROTEIN UA: NEGATIVE
RBC UA: NORMAL /HPF (ref 0–2)
SPECIFIC GRAVITY UA: 1.01 (ref 1.01–1.02)
TURBIDITY: CLEAR
URINE HGB: NEGATIVE
UROBILINOGEN, URINE: NORMAL
WBC UA: NORMAL /HPF (ref 0–5)

## 2022-04-11 PROCEDURE — 81001 URINALYSIS AUTO W/SCOPE: CPT

## 2022-04-11 PROCEDURE — 81003 URINALYSIS AUTO W/O SCOPE: CPT | Performed by: OBSTETRICS & GYNECOLOGY

## 2022-04-11 PROCEDURE — 99395 PREV VISIT EST AGE 18-39: CPT | Performed by: OBSTETRICS & GYNECOLOGY

## 2022-04-11 ASSESSMENT — PATIENT HEALTH QUESTIONNAIRE - PHQ9
1. LITTLE INTEREST OR PLEASURE IN DOING THINGS: 0
SUM OF ALL RESPONSES TO PHQ9 QUESTIONS 1 & 2: 0
SUM OF ALL RESPONSES TO PHQ QUESTIONS 1-9: 0
2. FEELING DOWN, DEPRESSED OR HOPELESS: 0
SUM OF ALL RESPONSES TO PHQ QUESTIONS 1-9: 0

## 2022-04-11 NOTE — PROGRESS NOTES
10/5/2021), Disp: 30 tablet, Rfl: 1    acetaminophen (TYLENOL) 325 MG tablet, Take 650 mg by mouth every 4 hours as needed for Pain  (Patient not taking: Reported on 2021), Disp: , Rfl:     Social History     Substance and Sexual Activity   Sexual Activity Yes    Partners: Male       Any bleeding or pain with intercourse: No    Last Yearly:  2021    Last pap: 2021 Normal    Last HPV: 21    Last Mammogram: Never    Last Clive Speak Never    Last colorectal screen- type:Never    Do you do self breast exams: Yes    Past Medical History:   Diagnosis Date    Asthma     rescue inhaler and daily use of flow vent inhaler    Asthma     Complication of anesthesia     Nausea    Mental disorder     anxiety    Mental disorder        Past Surgical History:   Procedure Laterality Date    ABDOMINAL EXPLORATION SURGERY  09/15/2019    RT Oophorectomy     SECTION N/A 2021     SE[facility]    LAPAROSCOPY Left 9/15/2019    EXPLORATORY LAPAROTOMY, RIGHT OOPHRECTOMY performed [facility]an [facility]         Family History   Problem Relation Age of Onset    Heart Defect Maternal Grandfather     Asthma Father     No Known Problems Mother     Heart Defect Sister     Other Other         No family hx stroke or ovarian cancer    Breast Cancer Maternal Great Grandmother        Chief Complaint   Patient presents with    Gynecologic Exam     Patient is being seen for yearly exam.  She notes some bilateral pain low abdomen. PE: Vital Signs  Blood pressure 116/78, height 5' 7\" (1.702 m), weight 164 lb (74.4 kg), last menstrual period 2022, currently breastfeeding. Estimated body mass index is 25.69 kg/m² as calculated from the following:    Height as of this encounter: 5' 7\" (1.702 m). Weight as of this encounter: 164 lb (74.4 kg). Labs:    No results found for this visit on 22.     PHQ-9 Total Score: 0 (4/11/2022  4:12 PM)      NURSE: Caterina GARCIA      HPI: here for annual exam - c/o dysuria fam hx of kidney stones    Review of Systems   Constitutional: Negative for chills and fever. Genitourinary: Positive for dysuria. Negative for menstrual problem, pelvic pain and vaginal discharge. Objective  Physical Exam  Constitutional:       General: She is not in acute distress. Appearance: Normal appearance. She is not ill-appearing. Genitourinary:      Vulva normal.      No vaginal discharge. Right Adnexa: not tender. Left Adnexa: not tender. Uterus is not tender. Breasts:      Right: No mass, nipple discharge, skin change or tenderness. Left: No mass, nipple discharge, skin change or tenderness. HENT:      Head: Normocephalic. Cardiovascular:      Rate and Rhythm: Normal rate and regular rhythm. Pulmonary:      Effort: Pulmonary effort is normal.      Breath sounds: Normal breath sounds. Abdominal:      Palpations: Abdomen is soft. Tenderness: There is no abdominal tenderness. There is no guarding or rebound. Musculoskeletal:         General: Normal range of motion. Neurological:      General: No focal deficit present. Mental Status: She is alert. Psychiatric:         Mood and Affect: Mood normal.         Behavior: Behavior normal.                                 Assessment and Plan          Diagnosis Orders   1. Women's annual routine gynecological examination     2. Dysuria  Urinalysis with Reflex to Culture   3. Hematuria, unspecified type           If UA neg then consider renal usn. I am having Maritza Moscoso maintain her acetaminophen, Ventolin HFA, Prenatal Vit-Fe Fumarate-FA (PRENATAL VITAMIN PO), loratadine, Flovent HFA, butalbital-acetaminophen-caffeine, and ibuprofen. Return in about 1 year (around 4/11/2023) for annual.    She was also counseled on her preventative health maintenance recommendations and follow-up.      There are no Patient Instructions on file for this visit.     Ciera Sosa ,8/61/9441 4:29 PM

## 2022-08-18 DIAGNOSIS — G44.89 OTHER HEADACHE SYNDROME: ICD-10-CM

## 2022-08-22 RX ORDER — BUTALBITAL, ACETAMINOPHEN AND CAFFEINE 50; 325; 40 MG/1; MG/1; MG/1
TABLET ORAL
Qty: 30 TABLET | Refills: 1 | Status: SHIPPED | OUTPATIENT
Start: 2022-08-22

## 2023-02-24 DIAGNOSIS — G44.89 OTHER HEADACHE SYNDROME: ICD-10-CM

## 2023-02-27 RX ORDER — BUTALBITAL, ACETAMINOPHEN AND CAFFEINE 50; 325; 40 MG/1; MG/1; MG/1
TABLET ORAL
Qty: 30 TABLET | Refills: 0 | Status: SHIPPED | OUTPATIENT
Start: 2023-02-27

## 2023-04-18 ENCOUNTER — OFFICE VISIT (OUTPATIENT)
Dept: OBGYN | Age: 23
End: 2023-04-18
Payer: COMMERCIAL

## 2023-04-18 VITALS
DIASTOLIC BLOOD PRESSURE: 68 MMHG | WEIGHT: 147 LBS | SYSTOLIC BLOOD PRESSURE: 102 MMHG | BODY MASS INDEX: 23.07 KG/M2 | HEIGHT: 67 IN

## 2023-04-18 DIAGNOSIS — Z01.419 WOMEN'S ANNUAL ROUTINE GYNECOLOGICAL EXAMINATION: Primary | ICD-10-CM

## 2023-04-18 PROCEDURE — 99395 PREV VISIT EST AGE 18-39: CPT | Performed by: OBSTETRICS & GYNECOLOGY

## 2023-04-18 SDOH — ECONOMIC STABILITY: FOOD INSECURITY: WITHIN THE PAST 12 MONTHS, YOU WORRIED THAT YOUR FOOD WOULD RUN OUT BEFORE YOU GOT MONEY TO BUY MORE.: NEVER TRUE

## 2023-04-18 SDOH — ECONOMIC STABILITY: INCOME INSECURITY: HOW HARD IS IT FOR YOU TO PAY FOR THE VERY BASICS LIKE FOOD, HOUSING, MEDICAL CARE, AND HEATING?: NOT HARD AT ALL

## 2023-04-18 SDOH — ECONOMIC STABILITY: FOOD INSECURITY: WITHIN THE PAST 12 MONTHS, THE FOOD YOU BOUGHT JUST DIDN'T LAST AND YOU DIDN'T HAVE MONEY TO GET MORE.: NEVER TRUE

## 2023-04-18 SDOH — ECONOMIC STABILITY: HOUSING INSECURITY
IN THE LAST 12 MONTHS, WAS THERE A TIME WHEN YOU DID NOT HAVE A STEADY PLACE TO SLEEP OR SLEPT IN A SHELTER (INCLUDING NOW)?: NO

## 2023-04-18 ASSESSMENT — PATIENT HEALTH QUESTIONNAIRE - PHQ9
SUM OF ALL RESPONSES TO PHQ QUESTIONS 1-9: 2
SUM OF ALL RESPONSES TO PHQ9 QUESTIONS 1 & 2: 2
SUM OF ALL RESPONSES TO PHQ QUESTIONS 1-9: 2
1. LITTLE INTEREST OR PLEASURE IN DOING THINGS: 1
2. FEELING DOWN, DEPRESSED OR HOPELESS: 1

## 2023-04-18 ASSESSMENT — ENCOUNTER SYMPTOMS
CONSTIPATION: 0
SHORTNESS OF BREATH: 0
ABDOMINAL PAIN: 0
DIARRHEA: 0

## 2023-04-18 NOTE — PROGRESS NOTES
motion. Neurological:      General: No focal deficit present. Mental Status: She is alert and oriented to person, place, and time. Skin:     General: Skin is warm and dry. Psychiatric:         Mood and Affect: Mood normal.         Behavior: Behavior normal.         Thought Content: Thought content normal.         Judgment: Judgment normal.                             Assessment and Plan          Diagnosis Orders   1. Women's annual routine gynecological examination                  I am having Maritza Moscoso maintain her Ventolin HFA, Prenatal Vit-Fe Fumarate-FA (PRENATAL VITAMIN PO), loratadine, ibuprofen, and butalbital-acetaminophen-caffeine. Return in about 1 year (around 4/18/2024) for annual.    She was also counseled on her preventative health maintenance recommendations and follow-up. There are no Patient Instructions on file for this visit.     Rachell Roper DO,4/18/2023 11:14 AM

## 2023-09-20 ENCOUNTER — OFFICE VISIT (OUTPATIENT)
Dept: OBGYN | Age: 23
End: 2023-09-20
Payer: COMMERCIAL

## 2023-09-20 VITALS
WEIGHT: 159 LBS | SYSTOLIC BLOOD PRESSURE: 112 MMHG | BODY MASS INDEX: 24.96 KG/M2 | HEIGHT: 67 IN | DIASTOLIC BLOOD PRESSURE: 62 MMHG

## 2023-09-20 DIAGNOSIS — N63.11 LUMP IN UPPER OUTER QUADRANT OF RIGHT BREAST: Primary | ICD-10-CM

## 2023-09-20 PROCEDURE — 99213 OFFICE O/P EST LOW 20 MIN: CPT | Performed by: ADVANCED PRACTICE MIDWIFE

## 2023-09-20 NOTE — PROGRESS NOTES
PROBLEM VISIT     Date of service: 2023    Anju Alexander  Is a 23 y.o. single, female    PT's PCP is: Natalya Mathew DO     : 2000                                             Subjective:       Patient's last menstrual period was 2023 (approximate). OB History    Para Term  AB Living   1 1 1     1   SAB IAB Ectopic Molar Multiple Live Births           0 1      # Outcome Date GA Lbr Kyle/2nd Weight Sex Delivery Anes PTL Lv   1 Term 21 37w0d  6 lb 6.5 oz (2.907 kg) M CS-LTranv Spinal  BETHEL        Social History     Tobacco Use   Smoking Status Never   Smokeless Tobacco Never        Social History     Substance and Sexual Activity   Alcohol Use Not Currently       Allergies: Patient has no known allergies. Current Outpatient Medications:     butalbital-acetaminophen-caffeine (FIORICET, ESGIC) -40 MG per tablet, TAKE 1-2 TABLETS BY MOUTH EVERY SIX (6) HOURS AS NEEDED FOR HEADACHE. NO MORE THAN 6 TABS IN 24HRS, Disp: 30 tablet, Rfl: 0    VENTOLIN  (90 Base) MCG/ACT inhaler, , Disp: , Rfl:     ibuprofen (ADVIL;MOTRIN) 800 MG tablet, Take 1 tablet by mouth every 8 hours as needed for Pain (Patient not taking: Reported on 10/5/2021), Disp: 30 tablet, Rfl: 1    Prenatal Vit-Fe Fumarate-FA (PRENATAL VITAMIN PO), Take by mouth (Patient not taking: Reported on 2023), Disp: , Rfl:     loratadine (CLARITIN) 10 MG capsule, Take 1 capsule by mouth daily, Disp: , Rfl:     Social History     Substance and Sexual Activity   Sexual Activity Not Currently    Partners: Male       Last Yearly date:  2021    Last pap date and results: 2021 negative    Last HPV date and results: never    Have you had a positive covid test: No    Have you had the covid immunization: No    Chief Complaint   Patient presents with    Breast Problem     C/O lump right breast , symptoms off and on for 1 week, painful at times.  Patient also has some discomfort right breast. Patient

## 2023-09-29 ENCOUNTER — HOSPITAL ENCOUNTER (OUTPATIENT)
Dept: ULTRASOUND IMAGING | Age: 23
End: 2023-09-29
Attending: ADVANCED PRACTICE MIDWIFE
Payer: COMMERCIAL

## 2023-09-29 DIAGNOSIS — N63.11 LUMP IN UPPER OUTER QUADRANT OF RIGHT BREAST: ICD-10-CM

## 2023-09-29 PROCEDURE — 76642 ULTRASOUND BREAST LIMITED: CPT

## 2024-04-23 ENCOUNTER — OFFICE VISIT (OUTPATIENT)
Dept: OBGYN | Age: 24
End: 2024-04-23
Payer: COMMERCIAL

## 2024-04-23 ENCOUNTER — HOSPITAL ENCOUNTER (OUTPATIENT)
Age: 24
Setting detail: SPECIMEN
Discharge: HOME | End: 2024-04-23
Payer: COMMERCIAL

## 2024-04-23 VITALS
DIASTOLIC BLOOD PRESSURE: 66 MMHG | SYSTOLIC BLOOD PRESSURE: 106 MMHG | WEIGHT: 154 LBS | BODY MASS INDEX: 24.17 KG/M2 | HEIGHT: 67 IN

## 2024-04-23 DIAGNOSIS — Z01.419 WOMEN'S ANNUAL ROUTINE GYNECOLOGICAL EXAMINATION: ICD-10-CM

## 2024-04-23 DIAGNOSIS — G43.829 MENSTRUAL MIGRAINE WITHOUT STATUS MIGRAINOSUS, NOT INTRACTABLE: ICD-10-CM

## 2024-04-23 DIAGNOSIS — R42 VERTIGO: ICD-10-CM

## 2024-04-23 DIAGNOSIS — Z01.419 WOMEN'S ANNUAL ROUTINE GYNECOLOGICAL EXAMINATION: Primary | ICD-10-CM

## 2024-04-23 PROCEDURE — 99395 PREV VISIT EST AGE 18-39: CPT | Performed by: OBSTETRICS & GYNECOLOGY

## 2024-04-23 PROCEDURE — G0145 SCR C/V CYTO,THINLAYER,RESCR: HCPCS

## 2024-04-23 SDOH — ECONOMIC STABILITY: INCOME INSECURITY: HOW HARD IS IT FOR YOU TO PAY FOR THE VERY BASICS LIKE FOOD, HOUSING, MEDICAL CARE, AND HEATING?: NOT HARD AT ALL

## 2024-04-23 SDOH — ECONOMIC STABILITY: FOOD INSECURITY: WITHIN THE PAST 12 MONTHS, YOU WORRIED THAT YOUR FOOD WOULD RUN OUT BEFORE YOU GOT MONEY TO BUY MORE.: NEVER TRUE

## 2024-04-23 SDOH — ECONOMIC STABILITY: FOOD INSECURITY: WITHIN THE PAST 12 MONTHS, THE FOOD YOU BOUGHT JUST DIDN'T LAST AND YOU DIDN'T HAVE MONEY TO GET MORE.: NEVER TRUE

## 2024-04-23 ASSESSMENT — ENCOUNTER SYMPTOMS
CONSTIPATION: 0
SHORTNESS OF BREATH: 0
DIARRHEA: 0
ABDOMINAL PAIN: 0

## 2024-04-23 ASSESSMENT — PATIENT HEALTH QUESTIONNAIRE - PHQ9
SUM OF ALL RESPONSES TO PHQ QUESTIONS 1-9: 2
1. LITTLE INTEREST OR PLEASURE IN DOING THINGS: SEVERAL DAYS
SUM OF ALL RESPONSES TO PHQ QUESTIONS 1-9: 2
2. FEELING DOWN, DEPRESSED OR HOPELESS: SEVERAL DAYS
SUM OF ALL RESPONSES TO PHQ9 QUESTIONS 1 & 2: 2

## 2024-04-23 NOTE — PROGRESS NOTES
Behavior: Behavior normal.         Thought Content: Thought content normal.         Judgment: Judgment normal.                                 Assessment and Plan          Diagnosis Orders   1. Women's annual routine gynecological examination  PAP SMEAR                I am having Maritza Moscoso maintain her Ventolin HFA and butalbital-acetaminophen-caffeine.    Return in about 1 year (around 4/23/2025) for annual.    She was also counseled on her preventative health maintenance recommendations and follow-up.     There are no Patient Instructions on file for this visit.    Marcy Staples DO,4/23/2024 10:23 AM

## 2024-04-26 LAB — CYTOLOGY REPORT: NORMAL

## 2024-05-02 ENCOUNTER — TELEPHONE (OUTPATIENT)
Dept: NEUROLOGY | Age: 24
End: 2024-05-02

## 2024-05-02 NOTE — TELEPHONE ENCOUNTER
05 02 2024 I called the patient times 2 (04 25 2024 and 05 02 2024 at  [phone]) to schedule new patient appointment with one of our providers, SANTY both times, no response.  I mailed the patient a letter asking them to call the office back to schedule this appointment.  KS

## 2024-05-31 ENCOUNTER — OFFICE VISIT (OUTPATIENT)
Dept: NEUROLOGY | Age: 24
End: 2024-05-31
Payer: COMMERCIAL

## 2024-05-31 VITALS
SYSTOLIC BLOOD PRESSURE: 110 MMHG | HEART RATE: 60 BPM | WEIGHT: 153.4 LBS | HEIGHT: 67 IN | BODY MASS INDEX: 24.08 KG/M2 | DIASTOLIC BLOOD PRESSURE: 76 MMHG

## 2024-05-31 DIAGNOSIS — G43.829 MENSTRUAL MIGRAINE WITHOUT STATUS MIGRAINOSUS, NOT INTRACTABLE: ICD-10-CM

## 2024-05-31 DIAGNOSIS — R42 VERTIGO: ICD-10-CM

## 2024-05-31 DIAGNOSIS — G43.709 CHRONIC MIGRAINE WITHOUT AURA WITHOUT STATUS MIGRAINOSUS, NOT INTRACTABLE: Primary | ICD-10-CM

## 2024-05-31 PROCEDURE — 99204 OFFICE O/P NEW MOD 45 MIN: CPT | Performed by: STUDENT IN AN ORGANIZED HEALTH CARE EDUCATION/TRAINING PROGRAM

## 2024-05-31 RX ORDER — AMITRIPTYLINE HYDROCHLORIDE 25 MG/1
25 TABLET, FILM COATED ORAL NIGHTLY
Qty: 90 TABLET | Refills: 1 | Status: SHIPPED | OUTPATIENT
Start: 2024-05-31

## 2024-05-31 RX ORDER — RIZATRIPTAN BENZOATE 10 MG/1
10 TABLET ORAL PRN
Qty: 9 TABLET | Refills: 5 | Status: SHIPPED | OUTPATIENT
Start: 2024-05-31

## 2024-05-31 RX ORDER — MECLIZINE HCL 12.5 MG/1
12.5 TABLET ORAL 3 TIMES DAILY PRN
Qty: 15 TABLET | Refills: 0 | Status: SHIPPED | OUTPATIENT
Start: 2024-05-31 | End: 2024-06-10

## 2024-05-31 NOTE — PROGRESS NOTES
[facility]            [address], [address]          [place], [place] [address]          Dept: [phone]          Dept Fax: [phone]          5/31/2024    HISTORY OF PRESENT ILLNESS:       I had the pleasure of seeing Maritza Moscoso who presents to establish neurologic care. The patient presents for evaluation of migraines and vertigo.      Headaches  Headaches started when she was in middle school   The pain is predominantly located in left frontotemporal area but can be on right side  Usually the headache is not preceded by an aura  Currently, headaches are occurring 2 days during her period and 4-5 in addition in month due to stress  Patient describes the quality of pain as combination of dull, sharp and throbbing which varies in intensity 9/10.     Associated symptoms include  nausea/vomiting, photophobia, phonophobia, and vertigo  Denies any other symptoms  Headaches are typically triggered by stress and menstrual cycles  Headaches are unable to obtain relief with OTC meds    History of:  Family history of headaches or migraines: yes - mother and maternal side of family  Head/Neck Trauma: no  Stressors: yes   Sleep:             Difficulty in initiating or maintaining sleep: yes - both,              Snoring: no      Headache Medications  Current abortive meds: Fioricet  Current prophylactic meds: no  Previous abortive medications tried: no  Previous prophylactic medications tried: no    Previous Workup:  CT head 2021    IMPRESSION:  1. No evidence of acute intracranial process on this unenhanced study as   described.                                                                             She notes she also has vertigo. She notes it started 2-3 months ago. She notes its intermittent and she is not aware of what triggers her migraines. She describes dizziness as a feeling like she is going to pass out as well as a room spinning sensation.       Past Medical History:

## 2024-06-21 ENCOUNTER — HOSPITAL ENCOUNTER (OUTPATIENT)
Dept: MRI IMAGING | Age: 24
Discharge: HOME | End: 2024-06-21
Payer: COMMERCIAL

## 2024-06-21 DIAGNOSIS — G43.709 CHRONIC MIGRAINE WITHOUT AURA WITHOUT STATUS MIGRAINOSUS, NOT INTRACTABLE: ICD-10-CM

## 2024-06-21 PROCEDURE — 6360000004 HC RX CONTRAST MEDICATION: Performed by: STUDENT IN AN ORGANIZED HEALTH CARE EDUCATION/TRAINING PROGRAM

## 2024-06-21 PROCEDURE — 70553 MRI BRAIN STEM W/O & W/DYE: CPT

## 2024-06-21 PROCEDURE — A9579 GAD-BASE MR CONTRAST NOS,1ML: HCPCS | Performed by: STUDENT IN AN ORGANIZED HEALTH CARE EDUCATION/TRAINING PROGRAM

## 2024-06-21 RX ADMIN — GADOTERIDOL 13 ML: 279.3 INJECTION, SOLUTION INTRAVENOUS at 08:38

## 2024-07-30 ENCOUNTER — OFFICE VISIT (OUTPATIENT)
Dept: OBGYN CLINIC | Age: 24
End: 2024-07-30

## 2024-07-30 ENCOUNTER — HOSPITAL ENCOUNTER (OUTPATIENT)
Age: 24
Setting detail: SPECIMEN
Discharge: HOME | End: 2024-07-30

## 2024-07-30 VITALS — SYSTOLIC BLOOD PRESSURE: 104 MMHG | BODY MASS INDEX: 25.06 KG/M2 | WEIGHT: 160 LBS | DIASTOLIC BLOOD PRESSURE: 70 MMHG

## 2024-07-30 DIAGNOSIS — R30.0 DYSURIA: Primary | ICD-10-CM

## 2024-07-30 DIAGNOSIS — R30.0 DYSURIA: ICD-10-CM

## 2024-07-30 DIAGNOSIS — N93.9 VAGINAL BLEEDING: ICD-10-CM

## 2024-07-30 LAB
BILIRUBIN, POC: NORMAL
BLOOD URINE, POC: NORMAL
CLARITY, POC: CLEAR
COLOR, POC: YELLOW
GLUCOSE URINE, POC: NORMAL
KETONES, POC: NORMAL
LEUKOCYTE EST, POC: NORMAL
NITRITE, POC: NORMAL
PH, POC: 7
PROTEIN, POC: NORMAL
SPECIFIC GRAVITY, POC: 1.02
UROBILINOGEN, POC: 0.2

## 2024-07-31 LAB
CANDIDA SPECIES: NEGATIVE
GARDNERELLA VAGINALIS: POSITIVE
SOURCE: ABNORMAL
TRICHOMONAS: NEGATIVE

## 2024-07-31 RX ORDER — METRONIDAZOLE 500 MG/1
500 TABLET ORAL 2 TIMES DAILY
Qty: 14 TABLET | Refills: 0 | Status: SHIPPED | OUTPATIENT
Start: 2024-07-31 | End: 2024-08-07

## 2024-08-01 LAB
MICROORGANISM SPEC CULT: ABNORMAL
SERVICE CMNT-IMP: ABNORMAL
SPECIMEN DESCRIPTION: ABNORMAL

## 2024-08-01 RX ORDER — CIPROFLOXACIN 500 MG/1
500 TABLET, FILM COATED ORAL DAILY
Qty: 3 TABLET | Refills: 0 | Status: SHIPPED | OUTPATIENT
Start: 2024-08-01 | End: 2024-08-04

## 2024-08-06 ENCOUNTER — TELEPHONE (OUTPATIENT)
Dept: OBGYN CLINIC | Age: 24
End: 2024-08-06

## 2024-08-06 NOTE — TELEPHONE ENCOUNTER
Patient called stating that she finished the Cipro and has 2 days left of her Flagyl.  She believes that she is now voiding blood.  She is not due for her cycle until 8/14 or 8/15.  She is noticing dysuria, chills, unsure if running fever, and intense back pain.  These symptoms did not improve with the Cipro.  She finished the Cipro 2 days ago.  Can you please review and give further recommendations?

## 2024-08-06 NOTE — TELEPHONE ENCOUNTER
I attempted to return patient's call and had to leave a message.  Left a detailed message with Claudette's recommendations, patient instructed to call with any further questions/concerns.

## 2024-09-24 ENCOUNTER — HOSPITAL ENCOUNTER (OUTPATIENT)
Age: 24
Setting detail: SPECIMEN
Discharge: HOME | End: 2024-09-24
Payer: COMMERCIAL

## 2024-09-24 ENCOUNTER — OFFICE VISIT (OUTPATIENT)
Dept: OBGYN | Age: 24
End: 2024-09-24
Payer: COMMERCIAL

## 2024-09-24 ENCOUNTER — ANCILLARY PROCEDURE (OUTPATIENT)
Dept: OBGYN | Age: 24
End: 2024-09-24
Payer: COMMERCIAL

## 2024-09-24 VITALS
HEIGHT: 67 IN | DIASTOLIC BLOOD PRESSURE: 64 MMHG | SYSTOLIC BLOOD PRESSURE: 102 MMHG | BODY MASS INDEX: 24.8 KG/M2 | WEIGHT: 158 LBS

## 2024-09-24 DIAGNOSIS — N83.202 LEFT OVARIAN CYST: ICD-10-CM

## 2024-09-24 DIAGNOSIS — R10.2 VAGINAL PAIN: ICD-10-CM

## 2024-09-24 DIAGNOSIS — N76.0 ACUTE VAGINITIS: ICD-10-CM

## 2024-09-24 DIAGNOSIS — R10.2 PELVIC PAIN: ICD-10-CM

## 2024-09-24 DIAGNOSIS — N92.6 IRREGULAR MENSTRUATION: ICD-10-CM

## 2024-09-24 DIAGNOSIS — N76.0 ACUTE VAGINITIS: Primary | ICD-10-CM

## 2024-09-24 PROBLEM — N93.9 VAGINAL BLEEDING: Status: RESOLVED | Noted: 2021-09-05 | Resolved: 2024-09-24

## 2024-09-24 PROBLEM — O46.90 VAGINAL BLEEDING IN PREGNANCY: Status: RESOLVED | Noted: 2021-07-03 | Resolved: 2024-09-24

## 2024-09-24 LAB
CANDIDA SPECIES: NEGATIVE
GARDNERELLA VAGINALIS: POSITIVE
SOURCE: ABNORMAL
TRICHOMONAS: NEGATIVE

## 2024-09-24 PROCEDURE — 99213 OFFICE O/P EST LOW 20 MIN: CPT | Performed by: OBSTETRICS & GYNECOLOGY

## 2024-09-24 PROCEDURE — 87510 GARDNER VAG DNA DIR PROBE: CPT

## 2024-09-24 PROCEDURE — 87480 CANDIDA DNA DIR PROBE: CPT

## 2024-09-24 PROCEDURE — 87660 TRICHOMONAS VAGIN DIR PROBE: CPT

## 2024-09-24 PROCEDURE — 76830 TRANSVAGINAL US NON-OB: CPT | Performed by: OBSTETRICS & GYNECOLOGY

## 2024-09-24 RX ORDER — LORATADINE 10 MG/1
10 TABLET ORAL DAILY
COMMUNITY

## 2024-09-24 RX ORDER — METRONIDAZOLE 500 MG/1
500 TABLET ORAL 2 TIMES DAILY
Qty: 14 TABLET | Refills: 0 | Status: SHIPPED | OUTPATIENT
Start: 2024-09-24 | End: 2024-10-01

## 2024-09-24 RX ORDER — FERROUS SULFATE 7.5 MG/0.5
15 SYRINGE (EA) ORAL EVERY OTHER DAY
COMMUNITY

## 2024-10-15 ENCOUNTER — TELEPHONE (OUTPATIENT)
Dept: OBGYN | Age: 24
End: 2024-10-15

## 2024-10-15 RX ORDER — NYSTATIN 100000 [USP'U]/ML
500000 SUSPENSION ORAL 4 TIMES DAILY
Qty: 200 ML | Refills: 0 | Status: SHIPPED | OUTPATIENT
Start: 2024-10-15 | End: 2024-10-25

## 2024-10-15 NOTE — TELEPHONE ENCOUNTER
Patient calls and states that the Flagyl caused very bad reflux and she is now having thrush.  She is trying to treat the reflux with pre&probiotics.  She is requesting a mouth wash to treat the thrush.  Does not want to take anymore pills.

## 2024-10-29 ENCOUNTER — OFFICE VISIT (OUTPATIENT)
Dept: OBGYN | Age: 24
End: 2024-10-29
Payer: COMMERCIAL

## 2024-10-29 VITALS
WEIGHT: 156.6 LBS | BODY MASS INDEX: 24.58 KG/M2 | DIASTOLIC BLOOD PRESSURE: 70 MMHG | HEART RATE: 71 BPM | SYSTOLIC BLOOD PRESSURE: 104 MMHG | HEIGHT: 67 IN | OXYGEN SATURATION: 99 %

## 2024-10-29 DIAGNOSIS — N76.0 ACUTE VAGINITIS: ICD-10-CM

## 2024-10-29 DIAGNOSIS — R10.2 VAGINAL PAIN: ICD-10-CM

## 2024-10-29 DIAGNOSIS — R10.2 PELVIC PAIN: Primary | ICD-10-CM

## 2024-10-29 PROCEDURE — 99213 OFFICE O/P EST LOW 20 MIN: CPT | Performed by: OBSTETRICS & GYNECOLOGY

## 2024-10-29 NOTE — PROGRESS NOTES
DATE OF VISIT:  10/29/24    PATIENT NAME:  Maritza Moscoso     YOB: 2000    REASON FOR VISIT:    Chief Complaint   Patient presents with    Pelvic Pain     Follow from [facility] regarding previous appointment with pelvic pain. Patient states the pain has subsided. Patient is questioning if she has  thrush from medication.        HISTORY OF PRESENT ILLNESS:  Pt had pelvic pain - usn done which is essentially normal; pt states that since treating the bv her symptoms have resolved; now clearing thrush; disc'd probiotic use long term        Patient's last menstrual period was 10/26/2024 (exact date).  Vitals:    10/29/24 0953   BP: 104/70   Pulse: 71   SpO2: 99%   Weight: 71 kg (156 lb 9.6 oz)   Height: 1.702 m (5' 7\")     Body mass index is 24.53 kg/m².  Allergies   Allergen Reactions    Metronidazole Diarrhea, Headaches and Nausea Only     Current Outpatient Medications   Medication Sig Dispense Refill    Probiotic Product (PROBIOTIC DAILY PO) Take by mouth      ferrous sulfate (NEO-IN-SOL) 75 (15 Fe) MG/ML solution Take 1 mL by mouth every other day      loratadine (CLARITIN) 10 MG tablet Take 1 tablet by mouth daily      aspirin-acetaminophen-caffeine (EXCEDRIN MIGRAINE) 250-250-65 MG per tablet Take 1 tablet by mouth every 6 hours as needed for Headaches      butalbital-acetaminophen-caffeine (FIORICET, ESGIC) -40 MG per tablet TAKE 1-2 TABLETS BY MOUTH EVERY SIX (6) HOURS AS NEEDED FOR HEADACHE. NO MORE THAN 6 TABS IN 24HRS 30 tablet 0    VENTOLIN  (90 Base) MCG/ACT inhaler        No current facility-administered medications for this visit.     Social History     Socioeconomic History    Marital status: Single     Spouse name: None    Number of children: None    Years of education: None    Highest education level: None   Tobacco Use    Smoking status: Never    Smokeless tobacco: Never   Vaping Use    Vaping status: Never Used   Substance and Sexual Activity    Alcohol use: Not

## (undated) DEVICE — SEALER ENDOSCP NANO COAT OPN DIV CRV L JAW LIGASURE IMPACT

## (undated) DEVICE — TUBING, SUCTION, 9/32" X 20', STRAIGHT: Brand: MEDLINE INDUSTRIES, INC.

## (undated) DEVICE — BLADE CLIPPER GEN PURP NS

## (undated) DEVICE — SYRINGE,EAR/ULCER, 2 OZ, STERILE: Brand: MEDLINE

## (undated) DEVICE — 3M™ STERI-DRAPE™ ISOLATION BAG, 10 PER CARTON / 4 CARTONS PER CASE, 1003: Brand: 3M™ STERI-DRAPE™

## (undated) DEVICE — TRAY CATH 16FR F INCLUDE LUB DRNGE BG STATLOK STBL DEV

## (undated) DEVICE — DRAPE,REIN 53X77,STERILE: Brand: MEDLINE

## (undated) DEVICE — SUTURE MCRYL SZ 0 L36IN ABSRB VLT CT-1 L36MM 1/2 CIR SGL Y346H

## (undated) DEVICE — YANKAUER,POOLE TIP,STERILE,50/CS: Brand: MEDLINE

## (undated) DEVICE — GLOVE ORANGE PI 7   MSG9070

## (undated) DEVICE — 3M™ STERI-STRIP™ REINFORCED ADHESIVE SKIN CLOSURES, R1547, 1/2 IN X 4 IN (12 MM X 100 MM), 6 STRIPS/ENVELOPE: Brand: 3M™ STERI-STRIP™

## (undated) DEVICE — SUTURE MCRYL 3-0 L27IN ABSRB VLT SH L26MM 1/2 CIR Y316H

## (undated) DEVICE — CONTAINER,SPECIMEN,4OZ,OR STRL: Brand: MEDLINE

## (undated) DEVICE — PACK PROCEDURE SURG C SECT CUST STRL

## (undated) DEVICE — AGENT HEMSTAT 5GM ARISTA AH

## (undated) DEVICE — MITT PREP W575XL775IN POVIDONE IOD HAIR REMV

## (undated) DEVICE — SPONGE LAP W18XL18IN WHT COT 4 PLY FLD STRUNG RADPQ DISP ST

## (undated) DEVICE — SUTURE ABSORBABLE BRAIDED 0 CT 36 IN DA UD VICRYL VCP958H

## (undated) DEVICE — STAPLER EXT SKIN 35 WIDE S STL STPL SQUEEZE HNDL VISISTAT

## (undated) DEVICE — TRI-LUMEN FILTERED TUBE SET WITH ACTIVATED CHARCOAL FILTER: Brand: AIRSEAL

## (undated) DEVICE — SYRINGE IRRIG 60ML SFT PLIABLE BLB EZ TO GRP 1 HND USE W/

## (undated) DEVICE — SUTURE PLN GUT SZ 3-0 L27IN ABSRB YELLOWISH TAN L36MM CT-1 842H

## (undated) DEVICE — PROTECTOR ULN NRV PUR FOAM HK LOOP STRP ANATOMICALLY

## (undated) DEVICE — YANKAUER,FLEXIBLE HANDLE,REGLR CAPACITY: Brand: MEDLINE INDUSTRIES, INC.

## (undated) DEVICE — CHLORAPREP 26ML ORANGE

## (undated) DEVICE — SPONGE COUNTING BAG: Brand: DEVON

## (undated) DEVICE — COUNTER NDL 40 COUNT HLD 70 FOAM BLK ADH W/ MAG

## (undated) DEVICE — TRAP,MUCUS SPECIMEN, 80CC: Brand: MEDLINE

## (undated) DEVICE — ELECTRODE ES AD CRD L15FT DISP FOR PT BELOW 30LB REM

## (undated) DEVICE — GARMENT,MEDLINE,DVT,INT,CALF,MED, GEN2: Brand: MEDLINE

## (undated) DEVICE — LEGGINGS, PAIR, 31X48, STERILE: Brand: MEDLINE

## (undated) DEVICE — Z INACTIVE USE 2527070 DRAPE SURG W40XL44IN UNDERBUTTOCK SMS POLYPR W/ PCH BK DISP

## (undated) DEVICE — Device

## (undated) DEVICE — SKIN AFFIX SURG ADHESIVE 72/CS 0.55ML: Brand: MEDLINE

## (undated) DEVICE — SUTURE MCRYL SZ 4-0 L18IN ABSRB UD L16MM PC-3 3/8 CIR PRIM Y845G

## (undated) DEVICE — SUTURE MCRYL + SZ 4 0 L18IN ABSRB UD PC 3 L16MM 3 8 CIR PRIM MCP845G

## (undated) DEVICE — COVER LT HNDL BLU PLAS

## (undated) DEVICE — GLOVE SURG SZ 65 THK91MIL LTX FREE SYN POLYISOPRENE

## (undated) DEVICE — SUTURE PROL SZ 1 L30IN NONABSORBABLE BLU L36MM CT-1 1/2 CIR 8425H

## (undated) DEVICE — ELECTRO LUBE IS A SINGLE PATIENT USE DEVICE THAT IS INTENDED TO BE USED ON ELECTROSURGICAL ELECTRODES TO REDUCE STICKING.: Brand: KEY SURGICAL ELECTRO LUBE

## (undated) DEVICE — 3M™ IOBAN™ 2 ANTIMICROBIAL INCISE DRAPE 6650EZ: Brand: IOBAN™ 2

## (undated) DEVICE — 40580 - THE PINK PAD - ADVANCED TRENDELENBURG POSITIONING KIT: Brand: 40580 - THE PINK PAD - ADVANCED TRENDELENBURG POSITIONING KIT

## (undated) DEVICE — SUTURE VCRL SZ 1 L36IN ABSRB UD L36MM CT-1 1/2 CIR J947H

## (undated) DEVICE — TOTAL TRAY, 16FR 10ML SIL FOLEY, URN: Brand: MEDLINE

## (undated) DEVICE — LABEL SYS CORR MED

## (undated) DEVICE — NEEDLE INSUF L150MM DIA2MM DISP FOR PNEUMOPERI ENDOPATH

## (undated) DEVICE — AIRSEAL 12 MM ACCESS PORT AND PALM GRIP OBTURATOR WITH BLADELESS OPTICAL TIP, 120 MM LENGTH: Brand: AIRSEAL

## (undated) DEVICE — GLOVE SURG SZ 8 L12IN FNGR THK79MIL GRN LTX FREE

## (undated) DEVICE — Z INACTIVE USE 2735373 APPLICATOR FBR LAIN COT WOOD TIP ECONOMICAL

## (undated) DEVICE — SCISSOR SURG METZ CRV TIP

## (undated) DEVICE — SOLUTION PREP POVIDONE IOD FOR SKIN MUCOUS MEM PRIOR TO

## (undated) DEVICE — TRAP SPEC COLL 40CC MUCOUS CALIB UNIV CONN FOR OPN SUCT

## (undated) DEVICE — E-Z CLEAN, NON-STICK, PTFE COATED, ELECTROSURGICAL NEEDLE ELECTRODE, MODIFIED EXTENDED INSULATION, 2.75 INCH (7 CM): Brand: MEGADYNE

## (undated) DEVICE — ADHESIVE SKIN CLSR 0.7ML TOP DERMBND ADV

## (undated) DEVICE — SUTURE VCRL SZ 3-0 L36IN ABSRB UD L36MM CT-1 1/2 CIR J944H

## (undated) DEVICE — SYRINGE EAR 3OZ GRN POLYVI CHL ULC IRRIG DISPOSABLE

## (undated) DEVICE — 3M™ STERI-STRIP™ REINFORCED ADHESIVE SKIN CLOSURES, R1546, 1/4 IN X 4 IN (6 MM X 100 MM), 10 STRIPS/ENVELOPE: Brand: 3M™ STERI-STRIP™

## (undated) DEVICE — TOWEL,OR,DSP,ST,NATURAL,DLX,4/PK,20PK/CS: Brand: MEDLINE

## (undated) DEVICE — SOLUTION ANTIFOG VIS SYS CLEARIFY LAPSCP

## (undated) DEVICE — GLOVE ORANGE PI 7 1/2   MSG9075

## (undated) DEVICE — COUNTER NDL 10 COUNT HLD 20 FOAM BLK SGL MAG

## (undated) DEVICE — PACK LAP BASIC

## (undated) DEVICE — INTENDED FOR TISSUE SEPARATION, AND OTHER PROCEDURES THAT REQUIRE A SHARP SURGICAL BLADE TO PUNCTURE OR CUT.: Brand: BARD-PARKER ® CARBON RIB-BACK BLADES